# Patient Record
Sex: MALE | Race: WHITE | NOT HISPANIC OR LATINO | Employment: OTHER | URBAN - METROPOLITAN AREA
[De-identification: names, ages, dates, MRNs, and addresses within clinical notes are randomized per-mention and may not be internally consistent; named-entity substitution may affect disease eponyms.]

---

## 2022-04-29 ENCOUNTER — APPOINTMENT (EMERGENCY)
Dept: RADIOLOGY | Facility: HOSPITAL | Age: 72
DRG: 194 | End: 2022-04-29
Payer: MEDICARE

## 2022-04-29 ENCOUNTER — OFFICE VISIT (OUTPATIENT)
Dept: URGENT CARE | Facility: CLINIC | Age: 72
End: 2022-04-29
Payer: MEDICARE

## 2022-04-29 ENCOUNTER — APPOINTMENT (EMERGENCY)
Dept: CT IMAGING | Facility: HOSPITAL | Age: 72
DRG: 194 | End: 2022-04-29
Payer: MEDICARE

## 2022-04-29 ENCOUNTER — HOSPITAL ENCOUNTER (INPATIENT)
Facility: HOSPITAL | Age: 72
LOS: 1 days | Discharge: HOME | DRG: 194 | End: 2022-04-30
Attending: EMERGENCY MEDICINE | Admitting: INTERNAL MEDICINE
Payer: MEDICARE

## 2022-04-29 ENCOUNTER — APPOINTMENT (INPATIENT)
Dept: NON INVASIVE DIAGNOSTICS | Facility: HOSPITAL | Age: 72
DRG: 194 | End: 2022-04-29
Payer: MEDICARE

## 2022-04-29 VITALS
TEMPERATURE: 97.6 F | OXYGEN SATURATION: 95 % | SYSTOLIC BLOOD PRESSURE: 134 MMHG | DIASTOLIC BLOOD PRESSURE: 75 MMHG | HEART RATE: 92 BPM

## 2022-04-29 DIAGNOSIS — R06.00 DYSPNEA ON EXERTION: Primary | ICD-10-CM

## 2022-04-29 DIAGNOSIS — I48.20 CHRONIC ATRIAL FIBRILLATION WITH RVR (HCC): ICD-10-CM

## 2022-04-29 DIAGNOSIS — J81.0 ACUTE PULMONARY EDEMA (HCC): ICD-10-CM

## 2022-04-29 DIAGNOSIS — R09.02 HYPOXIA: ICD-10-CM

## 2022-04-29 DIAGNOSIS — J18.9 PNEUMONIA: Primary | ICD-10-CM

## 2022-04-29 DIAGNOSIS — I48.91 ATRIAL FIBRILLATION, UNSPECIFIED TYPE (HCC): ICD-10-CM

## 2022-04-29 PROBLEM — K21.9 GERD (GASTROESOPHAGEAL REFLUX DISEASE): Status: ACTIVE | Noted: 2022-04-29

## 2022-04-29 PROBLEM — R19.7 DIARRHEA: Status: ACTIVE | Noted: 2022-04-29

## 2022-04-29 PROBLEM — J81.1 PULMONARY EDEMA: Status: ACTIVE | Noted: 2022-04-29

## 2022-04-29 LAB
2HR DELTA HS TROPONIN: 0 NG/L
4HR DELTA HS TROPONIN: -1 NG/L
ALBUMIN SERPL BCP-MCNC: 4.2 G/DL (ref 3.5–5)
ALP SERPL-CCNC: 91 U/L (ref 34–104)
ALT SERPL W P-5'-P-CCNC: 17 U/L (ref 7–52)
ANION GAP SERPL CALCULATED.3IONS-SCNC: 7 MMOL/L (ref 4–13)
AORTIC ROOT: 4.1 CM
AORTIC VALVE MEAN VELOCITY: 10.7 M/S
APICAL FOUR CHAMBER EJECTION FRACTION: 60 %
AST SERPL W P-5'-P-CCNC: 15 U/L (ref 13–39)
AV LVOT MEAN GRADIENT: 2 MMHG
AV LVOT PEAK GRADIENT: 3 MMHG
AV MEAN GRADIENT: 5 MMHG
AV PEAK GRADIENT: 9 MMHG
AV VELOCITY RATIO: 0.58
BASOPHILS # BLD AUTO: 0.02 THOUSANDS/ΜL (ref 0–0.1)
BASOPHILS NFR BLD AUTO: 0 % (ref 0–1)
BILIRUB SERPL-MCNC: 2.18 MG/DL (ref 0.2–1)
BNP SERPL-MCNC: 163 PG/ML (ref 0–100)
BUN SERPL-MCNC: 15 MG/DL (ref 5–25)
CALCIUM SERPL-MCNC: 9.4 MG/DL (ref 8.4–10.2)
CARDIAC TROPONIN I PNL SERPL HS: 5 NG/L
CARDIAC TROPONIN I PNL SERPL HS: 6 NG/L
CARDIAC TROPONIN I PNL SERPL HS: 6 NG/L
CHLORIDE SERPL-SCNC: 99 MMOL/L (ref 96–108)
CO2 SERPL-SCNC: 30 MMOL/L (ref 21–32)
CREAT SERPL-MCNC: 1.1 MG/DL (ref 0.6–1.3)
DOP CALC AO PEAK VEL: 1.5 M/S
DOP CALC AO VTI: 28.15 CM
DOP CALC LVOT PEAK VEL VTI: 19.43 CM
DOP CALC LVOT PEAK VEL: 0.87 M/S
DOP CALC MV VTI: 40.6 CM
DOP CALC RVOT PEAK VEL: 0.68 M/S
DOP CALC RVOT VTI: 11.12 CM
EOSINOPHIL # BLD AUTO: 0.01 THOUSAND/ΜL (ref 0–0.61)
EOSINOPHIL NFR BLD AUTO: 0 % (ref 0–6)
ERYTHROCYTE [DISTWIDTH] IN BLOOD BY AUTOMATED COUNT: 12.4 % (ref 11.6–15.1)
FLUAV RNA RESP QL NAA+PROBE: NEGATIVE
FLUBV RNA RESP QL NAA+PROBE: NEGATIVE
FRACTIONAL SHORTENING: 36 % (ref 28–44)
GFR SERPL CREATININE-BSD FRML MDRD: 67 ML/MIN/1.73SQ M
GLUCOSE SERPL-MCNC: 117 MG/DL (ref 65–140)
HCT VFR BLD AUTO: 43.7 % (ref 36.5–49.3)
HGB BLD-MCNC: 14.7 G/DL (ref 12–17)
IMM GRANULOCYTES # BLD AUTO: 0.05 THOUSAND/UL (ref 0–0.2)
IMM GRANULOCYTES NFR BLD AUTO: 0 % (ref 0–2)
INR PPP: 2.52 (ref 0.84–1.19)
INTERVENTRICULAR SEPTUM IN DIASTOLE (PARASTERNAL SHORT AXIS VIEW): 1 CM
INTERVENTRICULAR SEPTUM: 1 CM (ref 0.55–1.04)
LAAS-AP4: 34.6 CM2
LEFT ATRIUM SIZE: 4.2 CM
LEFT INTERNAL DIMENSION IN SYSTOLE: 3.6 CM (ref 3.77–5.7)
LEFT VENTRICULAR INTERNAL DIMENSION IN DIASTOLE: 5.6 CM (ref 6.25–9.32)
LEFT VENTRICULAR POSTERIOR WALL IN END DIASTOLE: 0.9 CM (ref 0.54–1.02)
LEFT VENTRICULAR STROKE VOLUME: 103 ML
LVSV (TEICH): 103 ML
LYMPHOCYTES # BLD AUTO: 0.66 THOUSANDS/ΜL (ref 0.6–4.47)
LYMPHOCYTES NFR BLD AUTO: 5 % (ref 14–44)
MCH RBC QN AUTO: 31.9 PG (ref 26.8–34.3)
MCHC RBC AUTO-ENTMCNC: 33.6 G/DL (ref 31.4–37.4)
MCV RBC AUTO: 95 FL (ref 82–98)
MONOCYTES # BLD AUTO: 1.31 THOUSAND/ΜL (ref 0.17–1.22)
MONOCYTES NFR BLD AUTO: 9 % (ref 4–12)
MV E'TISSUE VEL-SEP: 10 CM/S
MV MEAN GRADIENT: 4 MMHG
MV PEAK GRADIENT: 13 MMHG
NEUTROPHILS # BLD AUTO: 12.11 THOUSANDS/ΜL (ref 1.85–7.62)
NEUTS SEG NFR BLD AUTO: 86 % (ref 43–75)
NRBC BLD AUTO-RTO: 0 /100 WBCS
PLATELET # BLD AUTO: 154 THOUSANDS/UL (ref 149–390)
PMV BLD AUTO: 10.1 FL (ref 8.9–12.7)
POTASSIUM SERPL-SCNC: 4.5 MMOL/L (ref 3.5–5.3)
PROCALCITONIN SERPL-MCNC: 0.27 NG/ML
PROT SERPL-MCNC: 7.2 G/DL (ref 6.4–8.4)
PROTHROMBIN TIME: 26.5 SECONDS (ref 11.6–14.5)
RBC # BLD AUTO: 4.61 MILLION/UL (ref 3.88–5.62)
RIGHT VENTRICLE ID DIMENSION: 4.5 CM
RSV RNA RESP QL NAA+PROBE: NEGATIVE
SARS-COV-2 RNA RESP QL NAA+PROBE: NEGATIVE
SL CV LV EF: 50
SL CV PED ECHO LEFT VENTRICLE DIASTOLIC VOLUME (MOD BIPLANE) 2D: 156 ML
SL CV PED ECHO LEFT VENTRICLE SYSTOLIC VOLUME (MOD BIPLANE) 2D: 53 ML
SL CV RVOT MAX GRADIENT: 2 MMHG
SL CV RVOT MEAN GRADIENT: 1 MMHG
SL CV RVOT VMEAN: 0.46 M/S
SODIUM SERPL-SCNC: 136 MMOL/L (ref 135–147)
TR MAX PG: 34 MMHG
TR PEAK VELOCITY: 2.9 M/S
TRICUSPID VALVE PEAK REGURGITATION VELOCITY: 2.91 M/S
WBC # BLD AUTO: 14.16 THOUSAND/UL (ref 4.31–10.16)
Z-SCORE OF INTERVENTRICULAR SEPTUM IN END DIASTOLE: 1.65
Z-SCORE OF LEFT VENTRICULAR DIMENSION IN END DIASTOLE: -3.09
Z-SCORE OF LEFT VENTRICULAR DIMENSION IN END SYSTOLE: -1.99
Z-SCORE OF LEFT VENTRICULAR POSTERIOR WALL IN END DIASTOLE: 0.95

## 2022-04-29 PROCEDURE — 99285 EMERGENCY DEPT VISIT HI MDM: CPT | Performed by: EMERGENCY MEDICINE

## 2022-04-29 PROCEDURE — 93306 TTE W/DOPPLER COMPLETE: CPT | Performed by: INTERNAL MEDICINE

## 2022-04-29 PROCEDURE — 99285 EMERGENCY DEPT VISIT HI MDM: CPT

## 2022-04-29 PROCEDURE — 93306 TTE W/DOPPLER COMPLETE: CPT

## 2022-04-29 PROCEDURE — 80053 COMPREHEN METABOLIC PANEL: CPT | Performed by: EMERGENCY MEDICINE

## 2022-04-29 PROCEDURE — 99203 OFFICE O/P NEW LOW 30 MIN: CPT | Performed by: NURSE PRACTITIONER

## 2022-04-29 PROCEDURE — G0463 HOSPITAL OUTPT CLINIC VISIT: HCPCS | Performed by: NURSE PRACTITIONER

## 2022-04-29 PROCEDURE — 93005 ELECTROCARDIOGRAM TRACING: CPT | Performed by: NURSE PRACTITIONER

## 2022-04-29 PROCEDURE — 36415 COLL VENOUS BLD VENIPUNCTURE: CPT | Performed by: EMERGENCY MEDICINE

## 2022-04-29 PROCEDURE — 94640 AIRWAY INHALATION TREATMENT: CPT

## 2022-04-29 PROCEDURE — 99223 1ST HOSP IP/OBS HIGH 75: CPT | Performed by: PHYSICIAN ASSISTANT

## 2022-04-29 PROCEDURE — 96374 THER/PROPH/DIAG INJ IV PUSH: CPT

## 2022-04-29 PROCEDURE — 84145 PROCALCITONIN (PCT): CPT

## 2022-04-29 PROCEDURE — 71045 X-RAY EXAM CHEST 1 VIEW: CPT

## 2022-04-29 PROCEDURE — 93005 ELECTROCARDIOGRAM TRACING: CPT

## 2022-04-29 PROCEDURE — 85025 COMPLETE CBC W/AUTO DIFF WBC: CPT | Performed by: EMERGENCY MEDICINE

## 2022-04-29 PROCEDURE — 85610 PROTHROMBIN TIME: CPT | Performed by: EMERGENCY MEDICINE

## 2022-04-29 PROCEDURE — 83880 ASSAY OF NATRIURETIC PEPTIDE: CPT | Performed by: EMERGENCY MEDICINE

## 2022-04-29 PROCEDURE — 0241U HB NFCT DS VIR RESP RNA 4 TRGT: CPT | Performed by: EMERGENCY MEDICINE

## 2022-04-29 PROCEDURE — 84484 ASSAY OF TROPONIN QUANT: CPT | Performed by: EMERGENCY MEDICINE

## 2022-04-29 PROCEDURE — 71260 CT THORAX DX C+: CPT

## 2022-04-29 PROCEDURE — G1004 CDSM NDSC: HCPCS

## 2022-04-29 RX ORDER — ACETAMINOPHEN 325 MG/1
650 TABLET ORAL EVERY 4 HOURS PRN
Status: DISCONTINUED | OUTPATIENT
Start: 2022-04-29 | End: 2022-04-30 | Stop reason: HOSPADM

## 2022-04-29 RX ORDER — ATORVASTATIN CALCIUM 10 MG/1
20 TABLET, FILM COATED ORAL DAILY
Status: DISCONTINUED | OUTPATIENT
Start: 2022-04-29 | End: 2022-04-30 | Stop reason: HOSPADM

## 2022-04-29 RX ORDER — AMLODIPINE BESYLATE 5 MG/1
5 TABLET ORAL DAILY
Status: DISCONTINUED | OUTPATIENT
Start: 2022-04-29 | End: 2022-04-30

## 2022-04-29 RX ORDER — ONDANSETRON 2 MG/ML
4 INJECTION INTRAMUSCULAR; INTRAVENOUS EVERY 6 HOURS PRN
Status: DISCONTINUED | OUTPATIENT
Start: 2022-04-29 | End: 2022-04-30 | Stop reason: HOSPADM

## 2022-04-29 RX ORDER — CEFTRIAXONE 1 G/50ML
1000 INJECTION, SOLUTION INTRAVENOUS EVERY 24 HOURS
Status: DISCONTINUED | OUTPATIENT
Start: 2022-04-30 | End: 2022-04-30 | Stop reason: HOSPADM

## 2022-04-29 RX ORDER — OMEPRAZOLE 20 MG/1
20 CAPSULE, DELAYED RELEASE ORAL DAILY
COMMUNITY
Start: 2022-03-09

## 2022-04-29 RX ORDER — AMOXICILLIN 500 MG/1
2000 CAPSULE ORAL
COMMUNITY
Start: 2022-02-04

## 2022-04-29 RX ORDER — PANTOPRAZOLE SODIUM 40 MG/1
40 TABLET, DELAYED RELEASE ORAL
Status: DISCONTINUED | OUTPATIENT
Start: 2022-04-30 | End: 2022-04-30 | Stop reason: HOSPADM

## 2022-04-29 RX ORDER — METOPROLOL TARTRATE 100 MG/1
100 TABLET ORAL 2 TIMES DAILY
COMMUNITY
Start: 2022-02-04

## 2022-04-29 RX ORDER — WARFARIN SODIUM 5 MG/1
5 TABLET ORAL
Status: DISCONTINUED | OUTPATIENT
Start: 2022-04-29 | End: 2022-04-30 | Stop reason: HOSPADM

## 2022-04-29 RX ORDER — METOPROLOL TARTRATE 50 MG/1
100 TABLET, FILM COATED ORAL 2 TIMES DAILY
Status: DISCONTINUED | OUTPATIENT
Start: 2022-04-29 | End: 2022-04-30 | Stop reason: HOSPADM

## 2022-04-29 RX ORDER — MONTELUKAST SODIUM 10 MG/1
10 TABLET ORAL DAILY
COMMUNITY
Start: 2022-03-05

## 2022-04-29 RX ORDER — FUROSEMIDE 10 MG/ML
40 INJECTION INTRAMUSCULAR; INTRAVENOUS ONCE
Status: COMPLETED | OUTPATIENT
Start: 2022-04-29 | End: 2022-04-29

## 2022-04-29 RX ORDER — BUDESONIDE AND FORMOTEROL FUMARATE DIHYDRATE 160; 4.5 UG/1; UG/1
2 AEROSOL RESPIRATORY (INHALATION) 2 TIMES DAILY
Status: DISCONTINUED | OUTPATIENT
Start: 2022-04-29 | End: 2022-04-30 | Stop reason: HOSPADM

## 2022-04-29 RX ORDER — WARFARIN SODIUM 5 MG/1
5 TABLET ORAL
COMMUNITY
Start: 2022-04-11

## 2022-04-29 RX ORDER — IPRATROPIUM BROMIDE AND ALBUTEROL SULFATE 2.5; .5 MG/3ML; MG/3ML
3 SOLUTION RESPIRATORY (INHALATION) ONCE
Status: COMPLETED | OUTPATIENT
Start: 2022-04-29 | End: 2022-04-29

## 2022-04-29 RX ORDER — AZITHROMYCIN 250 MG/1
500 TABLET, FILM COATED ORAL EVERY 24 HOURS
Status: DISCONTINUED | OUTPATIENT
Start: 2022-04-30 | End: 2022-04-30 | Stop reason: HOSPADM

## 2022-04-29 RX ORDER — FUROSEMIDE 20 MG/1
20 TABLET ORAL DAILY
COMMUNITY
Start: 2022-04-10 | End: 2022-04-30

## 2022-04-29 RX ORDER — ALBUTEROL SULFATE 90 UG/1
2 AEROSOL, METERED RESPIRATORY (INHALATION) ONCE
Status: COMPLETED | OUTPATIENT
Start: 2022-04-29 | End: 2022-04-29

## 2022-04-29 RX ORDER — MONTELUKAST SODIUM 10 MG/1
10 TABLET ORAL DAILY
Status: DISCONTINUED | OUTPATIENT
Start: 2022-04-29 | End: 2022-04-30 | Stop reason: HOSPADM

## 2022-04-29 RX ORDER — BUDESONIDE AND FORMOTEROL FUMARATE DIHYDRATE 160; 4.5 UG/1; UG/1
2 AEROSOL RESPIRATORY (INHALATION) 2 TIMES DAILY
COMMUNITY

## 2022-04-29 RX ORDER — ATORVASTATIN CALCIUM 20 MG/1
20 TABLET, FILM COATED ORAL DAILY
COMMUNITY
Start: 2022-02-11

## 2022-04-29 RX ORDER — SODIUM CHLORIDE 9 MG/ML
3 INJECTION INTRAVENOUS
Status: DISCONTINUED | OUTPATIENT
Start: 2022-04-29 | End: 2022-04-30 | Stop reason: HOSPADM

## 2022-04-29 RX ORDER — CEFTRIAXONE 1 G/50ML
1000 INJECTION, SOLUTION INTRAVENOUS ONCE
Status: COMPLETED | OUTPATIENT
Start: 2022-04-29 | End: 2022-04-29

## 2022-04-29 RX ORDER — AMLODIPINE BESYLATE 5 MG/1
5 TABLET ORAL DAILY
COMMUNITY
Start: 2022-02-27 | End: 2022-04-30

## 2022-04-29 RX ORDER — FUROSEMIDE 10 MG/ML
40 INJECTION INTRAMUSCULAR; INTRAVENOUS DAILY
Status: DISCONTINUED | OUTPATIENT
Start: 2022-04-30 | End: 2022-04-30 | Stop reason: HOSPADM

## 2022-04-29 RX ADMIN — WARFARIN SODIUM 5 MG: 5 TABLET ORAL at 17:58

## 2022-04-29 RX ADMIN — FUROSEMIDE 40 MG: 10 INJECTION, SOLUTION INTRAMUSCULAR; INTRAVENOUS at 10:36

## 2022-04-29 RX ADMIN — METOPROLOL TARTRATE 100 MG: 50 TABLET ORAL at 17:58

## 2022-04-29 RX ADMIN — MONTELUKAST 10 MG: 10 TABLET, FILM COATED ORAL at 17:58

## 2022-04-29 RX ADMIN — ALBUTEROL SULFATE 2 PUFF: 90 AEROSOL, METERED RESPIRATORY (INHALATION) at 14:32

## 2022-04-29 RX ADMIN — AMLODIPINE BESYLATE 5 MG: 5 TABLET ORAL at 17:58

## 2022-04-29 RX ADMIN — IPRATROPIUM BROMIDE AND ALBUTEROL SULFATE 3 ML: 2.5; .5 SOLUTION RESPIRATORY (INHALATION) at 10:36

## 2022-04-29 RX ADMIN — IOHEXOL 85 ML: 350 INJECTION, SOLUTION INTRAVENOUS at 11:29

## 2022-04-29 RX ADMIN — AZITHROMYCIN MONOHYDRATE 500 MG: 500 INJECTION, POWDER, LYOPHILIZED, FOR SOLUTION INTRAVENOUS at 15:46

## 2022-04-29 RX ADMIN — CEFTRIAXONE 1000 MG: 1 INJECTION, SOLUTION INTRAVENOUS at 14:33

## 2022-04-29 RX ADMIN — ATORVASTATIN CALCIUM 20 MG: 10 TABLET, FILM COATED ORAL at 17:57

## 2022-04-29 NOTE — PATIENT INSTRUCTIONS
You are to go to the [facility] ED for evaluation of dyspnea  You are to follow up with your PCP after the ED visit  Do not eat, drink or void until you get to the ED and they agree     Heart Failure   AMBULATORY CARE:   Heart failure  is a condition that does not allow your heart to fill or pump properly  Not enough oxygen in your blood gets to your organs and tissues  Fluid may not move through your body properly  Fluid builds up and causes swelling and trouble breathing  This is known as congestive heart failure  Heart failure may start in the left or right ventricle  Heart failure is often caused by damage or injury to your heart  The damage may be caused by other heart problems, diabetes, or high blood pressure  The damage may have also been caused by an infection  Heart failure is a long-term condition that tends to get worse over time  It is important to manage your health to improve your quality of life  Common signs and symptoms:   · Trouble breathing with activity that worsens to trouble breathing at rest    · Shortness of breath while lying flat    · Severe shortness of breath and coughing at night that usually wakes you    · Feeling lightheaded when you stand up    · Purple color around your mouth and nails    · Confusion or anxiety    · Chest pain at night    · Periods of no breathing, then breathing fast    · Lack of energy (often worsened by physical activity), or trouble sleeping    · Swelling in your ankles, legs, or abdomen    · Heartbeat that is fast or not regular    · Fingers and toes feel cool to the touch    Call your local emergency number (911 in the [address] [facility]) if:   · You have any of the following signs of a heart attack:      ?  Squeezing, pressure, or pain in your chest    ? You may  also have any of the following:     § Discomfort or pain in your back, neck, jaw, stomach, or arm    § Shortness of breath    § Nausea or vomiting    § Lightheadedness or a sudden cold sweat      Call your doctor if:   · Your heartbeat is fast, slow, or uneven all the time  · You have symptoms of worsening heart failure:      ? Shortness of breath at rest, at night, or that is getting worse in any way    ? Weight gain of 3 or more pounds (1 4 kg) in a day, or more than your healthcare provider says is okay    ? More swelling in your legs or ankles    ? Abdominal pain or swelling    ? More coughing    ? Loss of appetite    ? Feeling tired all the time    · You feel hopeless or depressed, or you have lost interest in things you used to enjoy  · You often feel worried or afraid  · You have questions or concerns about your condition or care  Treatment for heart failure  may include any of the following:  · Medicines  may be needed to help regulate your heart rhythm  You may also need medicines to lower your blood pressure, and to decrease extra fluids  · Oxygen  may help you breathe easier if your oxygen level is lower than normal  A CPAP machine may be used to keep your airway open while you sleep  · Cardiac rehab  is a program run by specialists who will help you safely strengthen your heart  In the program you will learn about exercise, relaxation, stress management, and heart-healthy nutrition  Cardiac rehab may be recommended if your heart failure is not severe  · Surgery  can be done to implant a pacemaker or another device in your chest to regulate your heart rhythm  Other types of surgery can open blocked heart vessels, replace a damaged heart valve, or remove scar tissue  Manage swelling from extra fluid:   · Elevate (raise) your legs above the level of your heart  This will help with fluid that builds up in your legs or ankles  Elevate your legs as often as possible during the day  Prop your legs on pillows or blankets to keep them elevated comfortably  Try not to stand for long periods of time during the day  Move around to keep your blood circulating           · Limit sodium (salt)  Ask how much sodium you can have each day  Your healthcare provider may give you a limit, such as 2,300 milligrams (mg) a day  Your provider or a dietitian can teach you how to read food labels for the number of mg in a food  He or she can also help you find ways to have less salt  For example, if you add salt to food as you cook, do not add more at the table  · Drink liquids as directed  You may need to limit the amount of liquid you drink within 24 hours  Your healthcare provider will tell you how much liquid to have and which liquids are best for you  He or she may tell you to limit liquid to 1 5 to 2 liters in a day  He or she will also tell you how often to drink liquid throughout the day  · Weigh yourself every morning  Use the same scale, in the same spot  Do this after you use the bathroom, but before you eat or drink  Wear the same type of clothing each time  Write down your weight and call your healthcare provider if you have a sudden weight gain  Swelling and weight gain are signs of fluid buildup  Manage heart failure: Your quality of life may improve with treatment and the following:  · Do not smoke  Nicotine and other chemicals in cigarettes and cigars can cause lung and heart damage  Ask your healthcare provider for information if you currently smoke and need help to quit  E-cigarettes or smokeless tobacco still contain nicotine  Talk to your healthcare provider before you use these products  · Do not drink alcohol or use illegal drugs  Alcohol and drugs can increase your risk for high blood pressure, diabetes, and coronary artery disease  · Eat heart-healthy foods  Heart-healthy foods include fruits, vegetables, lean meat (such as beef, chicken, or pork), and low-fat dairy products  Fatty fish such as salmon and tuna are also heart healthy  Other heart-healthy foods include walnuts, whole-grain breads, beans, and cooked beans   Replace butter and margarine with heart-healthy oils such as olive oil or canola oil  Your provider or a dietitian can help you create heart-healthy meal plans  · Manage any chronic health conditions you have  These include high blood pressure, diabetes, obesity, high cholesterol, metabolic syndrome, and COPD  You will have fewer symptoms if you manage these health conditions  Follow your healthcare provider's recommendations and follow up with him or her regularly  · Maintain a healthy weight  Being overweight can increase your risk for high blood pressure, diabetes, and coronary artery disease  These conditions can make your symptoms worse  Ask your healthcare provider how much you should weigh  Ask him or her to help you create a weight loss plan if you are overweight  · Stay active  Activity can help keep your symptoms from getting worse  Walking is a type of physical activity that helps maintain your strength and improve your mood  Physical activity also helps you manage your weight  Work with your healthcare provider to create an exercise plan that is right for you  · Get vaccines as directed  The flu and pneumonia can be severe for a person who has heart failure  Vaccines protect you from these infections  Get a flu shot every year as soon as it is recommended, usually in September or October  You may also need the pneumonia vaccine  Your healthcare provider can tell you if you need other vaccines, and when to get them  Follow up with your doctor or cardiologist within 7 days or as directed: You may need to return for other tests  You may need home health care  A healthcare provider will monitor your vital signs, weight, and make sure your medicines are working  Write down your questions so you remember to ask them during your visits  Join a support group:  Heart failure can be difficult to manage  It may be helpful to talk with others who have heart failure   You may learn how to better manage your condition or get emotional support  For more information:  · [address]  [place] , [place]   Phone: [phone]  Web Address: https://www strong [employer]/  [facility] [address] 2022 Information is for End User's use only and may not be sold, redistributed or otherwise used for commercial purposes  All illustrations and images included in CareNotes® are the copyrighted property of [employer]  or [address] [place], [place] [address]angelica moses   The above information is an  only  It is not intended as medical advice for individual conditions or treatments  Talk to your doctor, nurse or pharmacist before following any medical regimen to see if it is safe and effective for you

## 2022-04-29 NOTE — Clinical Note
Case was discussed with MISTY and the patient's admission status was agreed to be Admission Status: observation status to the service of Dr Emily Cornejo

## 2022-04-29 NOTE — ASSESSMENT & PLAN NOTE
· Patient with c/o TORRE x 3-4 weeks  Wife states he was to see his PCP and CXR was done  He was then placed on Lasix 20 mg daily by his PCP  · Patient with SOB last night  · CXR:   1   Pulmonary vascular congestion without overt edema  Superimposed patchy bibasilar airspace opacities concerning for pneumonia  2   Cardiomegaly with exaggeration of the upper mediastinum likely a result of obliquity  Likely left atrial enlargement  Recommend repeat PA and lateral radiographs if possible  · CT chest:   1  Scattered groundglass and nodular infiltrates especially in the right lungconcerning for multifocal pneumonia  Correlation for Covid 19 pneumonia is recommended    Short interval follow-up with low-dose CT thorax is recommended in 3 months interval   2  Cardiomegaly with bilateral pleural effusions and pericardial calcifications  · Patient received IV lasix 40 mg x 1 dose in ED  · Will start IV lasix 40 mg daily  · Echocardiogram  · Cardiology consult

## 2022-04-29 NOTE — ASSESSMENT & PLAN NOTE
· Patient currently rate controlled  · Continue home regimen of Lopressor   · Anticoagulated on Coumadin  · INR 2 52, repeat in am

## 2022-04-29 NOTE — RESPIRATORY THERAPY NOTE
RT Protocol Note  Annabella Height 71 y o  male MRN: [id number]  Unit/Bed#: ED 26 Encounter: [id number]    Assessment    Principal Problem:    Pneumonia  Active Problems:    Pulmonary edema    A-fib (HCC)    GERD (gastroesophageal reflux disease)    Diarrhea      Home Pulmonary Medications:  symbicort       Past Medical History:   Diagnosis Date    A-fib ([facility] [address] )     Mitral valve problem      Social History     Socioeconomic History    Marital status: /Civil Union     Spouse name: None    Number of children: None    Years of education: None    Highest education level: None   Occupational History    None   Tobacco Use    Smoking status: Never Smoker    Smokeless tobacco: Never Used   Vaping Use    Vaping Use: Never used   Substance and Sexual Activity    Alcohol use: Yes     Alcohol/week: 4 0 standard drinks     Types: 4 Cans of beer per week     Comment: daily    Drug use: Never    Sexual activity: None   Other Topics Concern    None   Social History Narrative    None     Social Determinants of Health     Financial Resource Strain: Not on file   Food Insecurity: Not on file   Transportation Needs: Not on file   Physical Activity: Not on file   Stress: Not on file   Social Connections: Not on file   Intimate Partner Violence: Not on file   Housing Stability: Not on file       Subjective         Objective    Physical Exam:   Assessment Type: (P) Assess only  General Appearance: (P) Alert,Awake  Respiratory Pattern: (P) Normal  Chest Assessment: (P) Chest expansion symmetrical  Bilateral Breath Sounds: (P) Clear  Cough: (P) Non-productive,Moist  O2 Device: (P) rma    Vitals:  Blood pressure 140/80, pulse 80, temperature 97 8 °F (36 6 °C), temperature source Tympanic, resp  rate 16, height 6' (1 829 m), weight 94 3 kg (208 lb), SpO2 98 %  Imaging and other studies: I have personally reviewed pertinent reports        O2 Device: (P) rma     Plan    Respiratory Plan: (P) Discontinue Protocol Resp Comments: (P) Pt  admitted for TORRE  Hx of asthma  Pt suses symbicort at home  Pt reports his brathing is much better after lasix  [facility]  No resp  therapy indicated  Will d/c resp   protocol

## 2022-04-29 NOTE — H&P
[address]  H&PDantcyndi Mendiola 1950, 71 y o  male MRN: [id number]  Unit/Bed#: ED 26 Encounter: [id number]  Primary Care Provider: No primary care provider on file  Date and time admitted to hospital: 4/29/2022  9:26 AM    * Pneumonia  Assessment & Plan  · Patient with TORRE x 3-4 weeks  Improved with PO lasix started by PCP after CXR  Patient with SOB last night  · CT Chest:  1  Scattered groundglass and nodular infiltrates especially in the right lungconcerning for multifocal pneumonia  Correlation for Covid 19 pneumonia is recommended  Short interval follow-up with low-dose CT thorax is recommended in 3 months interval   2  Cardiomegaly with bilateral pleural effusions and pericardial calcifications  · Patient received IV Rocephin and po azithromycin in the ED, will continue on admission  · Check strep pneumonia and legionella urinary antigens  · Sputum culture  · Labs in am    Pulmonary edema  Assessment & Plan  · Patient with c/o TORRE x 3-4 weeks  Wife states he was to see his PCP and CXR was done  He was then placed on Lasix 20 mg daily by his PCP  · Patient with SOB last night  · CXR:   1   Pulmonary vascular congestion without overt edema  Superimposed patchy bibasilar airspace opacities concerning for pneumonia  2   Cardiomegaly with exaggeration of the upper mediastinum likely a result of obliquity  Likely left atrial enlargement  Recommend repeat PA and lateral radiographs if possible  · CT chest:   1  Scattered groundglass and nodular infiltrates especially in the right lungconcerning for multifocal pneumonia  Correlation for Covid 19 pneumonia is recommended    Short interval follow-up with low-dose CT thorax is recommended in 3 months interval   2  Cardiomegaly with bilateral pleural effusions and pericardial calcifications  · Patient received IV lasix 40 mg x 1 dose in ED  · Will start IV lasix 40 mg daily  · Echocardiogram  · Cardiology consult    A-fib [facility])  Assessment & Plan  · Patient currently rate controlled  · Continue home regimen of Lopressor   · Anticoagulated on Coumadin  · INR 2 52, repeat in am    GERD (gastroesophageal reflux disease)  Assessment & Plan  · Continue home regimen of PPI    Diarrhea  Assessment & Plan  · Patient reports diarrhea last night  · Check cdiff, stool culture    VTE Pharmacologic Prophylaxis: VTE Score: 4 Moderate Risk (Score 3-4) - Pharmacological DVT Prophylaxis Ordered: warfarin (Coumadin)  Code Status: Level 1 - Full Code   Discussion with family: Updated  (wife) at bedside  Anticipated Length of Stay: Patient will be admitted on an inpatient basis with an anticipated length of stay of greater than 2 midnights secondary to Need for IV antibiotics, IV lasix, echo  Total Time for Visit, including Counseling / Coordination of Care: 60 minutes Greater than 50% of this total time spent on direct patient counseling and coordination of care  Chief Complaint: shortness of breath    History of Present Illness:  Carolina Hoyos is a 71 y o  male with a PMH of Afib, HTN, GERD who presents with a complaint of shortness of breath and diarrhea  The patient states he was having TORRE about 3-4 weeks ago  He saw his PCP and had a CXR then started on PO lasix 20 mg daily  The last night after eating dinner the patient states that started to not feel well had some diarrhea and shortness of breath returned  Patient admits to chills and his wife's stated he felt warm  She states that he cannot walk few feet without being short of breath which prompted him to come to emergency department  He denies any headaches, chest pain, abdominal pain, nausea/vomiting  He states he is feeling improved since coming in the emergency department  Chest x-ray showed pulmonary vascular congestion with superimposed patchy bibasilar airspace opacities concerning for pneumonia  Patient had CT of the chest which showed similar findings  Patient denies history of CHF  Review of Systems:  Review of Systems   Constitutional: Positive for chills and fever  Respiratory: Positive for shortness of breath  Gastrointestinal: Positive for diarrhea  Past Medical and Surgical History:   Past Medical History:   Diagnosis Date    A-fib [facility])     Mitral valve problem        History reviewed  No pertinent surgical history  Meds/Allergies:  Prior to Admission medications    Medication Sig Start Date End Date Taking? Authorizing Provider   amLODIPine (NORVASC) 5 mg tablet Take 5 mg by mouth daily 2/27/22  Yes Historical Provider, MD   atorvastatin (LIPITOR) 20 mg tablet Take 20 mg by mouth daily 2/11/22  Yes Historical Provider, MD   budesonide-formoterol (SYMBICORT) 160-4 5 mcg/act inhaler Inhale 2 puffs 2 (two) times a day Rinse mouth after use  Yes Historical Provider, MD   furosemide (LASIX) 20 mg tablet Take 20 mg by mouth daily 4/10/22  Yes Historical Provider, MD   metoprolol tartrate (LOPRESSOR) 100 mg tablet Take 100 mg by mouth 2 (two) times a day 2/4/22  Yes Historical Provider, MD   montelukast (SINGULAIR) 10 mg tablet Take 10 mg by mouth daily As directed 3/5/22  Yes Historical Provider, MD   omeprazole (PriLOSEC) 20 mg delayed release capsule Take 20 mg by mouth daily 3/9/22  Yes Historical Provider, MD   warfarin (COUMADIN) 5 mg tablet 5 mg   4/11/22  Yes Historical Provider, MD   amoxicillin (AMOXIL) 500 mg capsule Take 2,000 mg by mouth 60 minutes pre-procedure 2/4/22   Historical Provider, MD     I have reviewed home medications with patient family member      Allergies: No Known Allergies    Social History:  Marital Status: /Civil Union   Occupation: unknown  Patient Pre-hospital Living Situation: Home  Patient Pre-hospital Level of Mobility: walks  Patient Pre-hospital Diet Restrictions: none  Substance Use History:   Social History     Substance and Sexual Activity   Alcohol Use Yes    Alcohol/week: 4 0 standard drinks    Types: 4 Cans of beer per week    Comment: daily     Social History     Tobacco Use   Smoking Status Never Smoker   Smokeless Tobacco Never Used     Social History     Substance and Sexual Activity   Drug Use Never       Family History:  History reviewed  No pertinent family history  Physical Exam:     Vitals:   Blood Pressure: 132/76 (04/29/22 1315)  Pulse: 103 (04/29/22 1315)  Temperature: 97 8 °F (36 6 °C) (04/29/22 1511)  Temp Source: Tympanic (04/29/22 1511)  Respirations: 16 (04/29/22 1315)  Height: 6' (182 9 cm) (04/29/22 3272)  Weight - Scale: 94 3 kg (208 lb) (04/29/22 0923)  SpO2: 98 % (04/29/22 1511)    Physical Exam  Vitals and nursing note reviewed  Constitutional:       General: He is awake  Appearance: Normal appearance  HENT:      Head: Normocephalic and atraumatic  Cardiovascular:      Rate and Rhythm: Normal rate and regular rhythm  Pulmonary:      Effort: Pulmonary effort is normal       Breath sounds: Normal breath sounds  No wheezing, rhonchi or rales  Abdominal:      Palpations: Abdomen is soft  Tenderness: There is no abdominal tenderness  Skin:     General: Skin is warm and dry  Neurological:      General: No focal deficit present  Mental Status: He is alert and oriented to person, place, and time  Psychiatric:         Attention and Perception: Attention normal          Mood and Affect: Mood normal          Speech: Speech normal          Behavior: Behavior is cooperative          Additional Data:     Lab Results:  Results from last 7 days   Lab Units 04/29/22  1020   WBC Thousand/uL 14 16*   HEMOGLOBIN g/dL 14 7   HEMATOCRIT % 43 7   PLATELETS Thousands/uL 154   NEUTROS PCT % 86*   LYMPHS PCT % 5*   MONOS PCT % 9   EOS PCT % 0     Results from last 7 days   Lab Units 04/29/22  1020   SODIUM mmol/L 136   POTASSIUM mmol/L 4 5   CHLORIDE mmol/L 99   CO2 mmol/L 30   BUN mg/dL 15   CREATININE mg/dL 1 10   ANION GAP mmol/L 7   CALCIUM mg/dL 9 4   ALBUMIN g/dL 4 2   TOTAL BILIRUBIN mg/dL 2 18*   ALK PHOS U/L 91   ALT U/L 17   AST U/L 15   GLUCOSE RANDOM mg/dL 117     Results from last 7 days   Lab Units 04/29/22  1020   INR  2 52*                   Imaging: Reviewed radiology reports from this admission including: chest xray and chest CT scan  CT chest with contrast   Final Result by Moi Garcia MD (04/29 1157)   1  Scattered groundglass and nodular infiltrates especially in the right lungconcerning for multifocal pneumonia  Correlation for Covid 19 pneumonia is recommended  Short interval follow-up with low-dose CT thorax is recommended in 3 months interval    2  Cardiomegaly with bilateral pleural effusions and pericardial calcifications  The study was marked in [facility] for immediate notification of acute findings and need for short interval follow-up  Workstation performed: [id number]         X-ray chest 1 view portable   ED Interpretation by Gayla Goncalves MD (04/29 1008)   ED wet read:  Pulmonary vascular congestion noted      Final Result by Klarissa Aparicio MD (04/29 1057)      1  Pulmonary vascular congestion without overt edema  Superimposed patchy bibasilar airspace opacities concerning for pneumonia  2   Cardiomegaly with exaggeration of the upper mediastinum likely a result of obliquity  Likely left atrial enlargement  Recommend repeat PA and lateral radiographs if possible  The study was marked in EPIC for significant notification  Workstation performed: [id number]                 ** Please Note: This note has been constructed using a voice recognition system   **

## 2022-04-29 NOTE — PROGRESS NOTES
[address]        NAME: Anuja Rodriguez is a 71 y o  male  : 1950    MRN: [id number]  DATE: 2022  TIME: 8:49 AM    Assessment and Plan   Dyspnea on exertion [R06 00]  1  Dyspnea on exertion  Transfer to other facility   2  Chronic atrial fibrillation with RVR ([facility] [address] )  Transfer to other facility         Patient Instructions       Follow up with PCP in 3-5 days  Proceed to  ER if symptoms worsen  You are to go to the [facility] ED for evaluation of dyspnea  You are to follow up with your PCP after the ED visit  Do not eat, drink or void until you get to the ED and they agree      Chief Complaint     Chief Complaint   Patient presents with    Respiratory Distress     3 days ago, has h/o asthma    Diarrhea     resolved as of this morning     Cough     has brownish mucus         History of Present Illness       This is a 71year old male who moved to [place] 1 yr ago and states has put on 30 pounds  He states that over the last few days he has noted a cough, and shortness of breath  He states he does have asthma  He is on lasix for "a heart valve problem" and has chronic Afib and is on coumadin  Pt states he had some diarrhea from fish he ate last night and it has subsided since  He states the cough is a "brownish color"  Pt was 92 % room air with tachypnea while walking up ramp  95% at rest         Review of Systems   Review of Systems   Constitutional: Negative  HENT: Negative  Eyes: Negative  Respiratory: Positive for cough and shortness of breath  Cardiovascular: Negative for chest pain  Gastrointestinal: Positive for diarrhea  Endocrine: Negative  Genitourinary: Negative  Musculoskeletal: Negative  Allergic/Immunologic: Negative  Neurological: Negative  Hematological: Negative  Psychiatric/Behavioral: Negative            Current Medications       Current Outpatient Medications:     amLODIPine (NORVASC) 5 mg tablet, Take 5 mg by mouth daily, Disp: , Rfl:     amoxicillin (AMOXIL) 500 mg capsule, Take 2,000 mg by mouth 60 minutes pre-procedure, Disp: , Rfl:     atorvastatin (LIPITOR) 20 mg tablet, Take 20 mg by mouth daily, Disp: , Rfl:     furosemide (LASIX) 20 mg tablet, Take 20 mg by mouth daily, Disp: , Rfl:     metoprolol tartrate (LOPRESSOR) 100 mg tablet, Take 100 mg by mouth 2 (two) times a day, Disp: , Rfl:     montelukast (SINGULAIR) 10 mg tablet, Take 10 mg by mouth daily As directed, Disp: , Rfl:     omeprazole (PriLOSEC) 20 mg delayed release capsule, Take 20 mg by mouth daily, Disp: , Rfl:     warfarin (COUMADIN) 5 mg tablet, TAKE 1 TO 2 TABLET BY MOUTH EVERY DAY OR AS DIRECTED BY INR RESULTS, Disp: , Rfl:     Current Allergies     Allergies as of 04/29/2022    (No Known Allergies)            The following portions of the patient's history were reviewed and updated as appropriate: allergies, current medications, past family history, past medical history, past social history, past surgical history and problem list      Past Medical History:   Diagnosis Date    A-fib [facility])     Mitral valve problem        History reviewed  No pertinent surgical history  History reviewed  No pertinent family history  Medications have been verified  Objective   /75   Pulse 92   Temp 97 6 °F (36 4 °C)   SpO2 95% Comment: 92 on exertion  No LMP for male patient  Physical Exam     Physical Exam  Vitals and nursing note reviewed  Constitutional:       General: He is not in acute distress  Appearance: Normal appearance  He is normal weight  He is not ill-appearing, toxic-appearing or diaphoretic  HENT:      Head: Normocephalic and atraumatic  Nose: Nose normal       Mouth/Throat:      Mouth: Mucous membranes are moist       Pharynx: Oropharynx is clear  No oropharyngeal exudate or posterior oropharyngeal erythema  Eyes:      Extraocular Movements: Extraocular movements intact     Cardiovascular:      Rate and Rhythm: Tachycardia present  Rhythm irregular  Pulses: Normal pulses  Heart sounds: Normal heart sounds  Comments: Sock marking edema to ankles   Pulmonary:      Comments: Tachypnea with ambulation  Slightly SOB with talking  Pt does stutter  Lungs are very decreased though out in all fields  Abdominal:      General: There is no distension  Palpations: Abdomen is soft  Tenderness: There is no abdominal tenderness  Musculoskeletal:         General: Normal range of motion  Cervical back: Normal range of motion and neck supple  Skin:     General: Skin is warm and dry  Capillary Refill: Capillary refill takes less than 2 seconds  Neurological:      General: No focal deficit present  Mental Status: He is alert and oriented to person, place, and time  Psychiatric:         Mood and Affect: Mood normal          Behavior: Behavior normal          Thought Content:  Thought content normal          Judgment: Judgment normal

## 2022-04-29 NOTE — ASSESSMENT & PLAN NOTE
· Patient with TORRE x 3-4 weeks  Improved with PO lasix started by PCP after CXR  Patient with SOB last night  · CT Chest:  1  Scattered groundglass and nodular infiltrates especially in the right lungconcerning for multifocal pneumonia  Correlation for Covid 19 pneumonia is recommended  Short interval follow-up with low-dose CT thorax is recommended in 3 months interval   2  Cardiomegaly with bilateral pleural effusions and pericardial calcifications  · Patient received IV Rocephin and po azithromycin in the ED, will continue on admission  · Check strep pneumonia and legionella urinary antigens     · Sputum culture  · Labs in am

## 2022-04-30 VITALS
TEMPERATURE: 98.2 F | WEIGHT: 208 LBS | RESPIRATION RATE: 17 BRPM | DIASTOLIC BLOOD PRESSURE: 74 MMHG | SYSTOLIC BLOOD PRESSURE: 126 MMHG | OXYGEN SATURATION: 96 % | BODY MASS INDEX: 28.17 KG/M2 | HEART RATE: 76 BPM | HEIGHT: 72 IN

## 2022-04-30 PROBLEM — E87.6 HYPOKALEMIA: Status: ACTIVE | Noted: 2022-04-30

## 2022-04-30 LAB
ANION GAP SERPL CALCULATED.3IONS-SCNC: 6 MMOL/L (ref 4–13)
ATRIAL RATE: 80 BPM
ATRIAL RATE: 97 BPM
BUN SERPL-MCNC: 18 MG/DL (ref 5–25)
CALCIUM SERPL-MCNC: 9.1 MG/DL (ref 8.4–10.2)
CHLORIDE SERPL-SCNC: 99 MMOL/L (ref 96–108)
CO2 SERPL-SCNC: 31 MMOL/L (ref 21–32)
CREAT SERPL-MCNC: 0.95 MG/DL (ref 0.6–1.3)
ERYTHROCYTE [DISTWIDTH] IN BLOOD BY AUTOMATED COUNT: 12.4 % (ref 11.6–15.1)
GFR SERPL CREATININE-BSD FRML MDRD: 80 ML/MIN/1.73SQ M
GLUCOSE SERPL-MCNC: 104 MG/DL (ref 65–140)
HCT VFR BLD AUTO: 40.9 % (ref 36.5–49.3)
HGB BLD-MCNC: 13.4 G/DL (ref 12–17)
MAGNESIUM SERPL-MCNC: 2.2 MG/DL (ref 1.9–2.7)
MCH RBC QN AUTO: 31.2 PG (ref 26.8–34.3)
MCHC RBC AUTO-ENTMCNC: 32.8 G/DL (ref 31.4–37.4)
MCV RBC AUTO: 95 FL (ref 82–98)
PHOSPHATE SERPL-MCNC: 2.5 MG/DL (ref 2.3–4.1)
PLATELET # BLD AUTO: 149 THOUSANDS/UL (ref 149–390)
PMV BLD AUTO: 9.7 FL (ref 8.9–12.7)
POTASSIUM SERPL-SCNC: 3.3 MMOL/L (ref 3.5–5.3)
PROCALCITONIN SERPL-MCNC: 0.33 NG/ML
QRS AXIS: 87 DEGREES
QRS AXIS: 91 DEGREES
QRSD INTERVAL: 168 MS
QRSD INTERVAL: 170 MS
QT INTERVAL: 400 MS
QT INTERVAL: 404 MS
QTC INTERVAL: 477 MS
QTC INTERVAL: 521 MS
RBC # BLD AUTO: 4.3 MILLION/UL (ref 3.88–5.62)
SODIUM SERPL-SCNC: 136 MMOL/L (ref 135–147)
T WAVE AXIS: 13 DEGREES
T WAVE AXIS: 2 DEGREES
VENTRICULAR RATE: 102 BPM
VENTRICULAR RATE: 84 BPM
WBC # BLD AUTO: 8.66 THOUSAND/UL (ref 4.31–10.16)

## 2022-04-30 PROCEDURE — 80048 BASIC METABOLIC PNL TOTAL CA: CPT | Performed by: PHYSICIAN ASSISTANT

## 2022-04-30 PROCEDURE — 84145 PROCALCITONIN (PCT): CPT | Performed by: PHYSICIAN ASSISTANT

## 2022-04-30 PROCEDURE — 85027 COMPLETE CBC AUTOMATED: CPT | Performed by: PHYSICIAN ASSISTANT

## 2022-04-30 PROCEDURE — 99238 HOSP IP/OBS DSCHRG MGMT 30/<: CPT | Performed by: PHYSICIAN ASSISTANT

## 2022-04-30 PROCEDURE — 84100 ASSAY OF PHOSPHORUS: CPT | Performed by: PHYSICIAN ASSISTANT

## 2022-04-30 PROCEDURE — 83735 ASSAY OF MAGNESIUM: CPT | Performed by: PHYSICIAN ASSISTANT

## 2022-04-30 PROCEDURE — 93010 ELECTROCARDIOGRAM REPORT: CPT | Performed by: INTERNAL MEDICINE

## 2022-04-30 PROCEDURE — 36415 COLL VENOUS BLD VENIPUNCTURE: CPT | Performed by: PHYSICIAN ASSISTANT

## 2022-04-30 PROCEDURE — 99223 1ST HOSP IP/OBS HIGH 75: CPT | Performed by: INTERNAL MEDICINE

## 2022-04-30 RX ORDER — DILTIAZEM HYDROCHLORIDE 180 MG/1
180 CAPSULE, COATED, EXTENDED RELEASE ORAL DAILY
Qty: 30 CAPSULE | Refills: 0 | Status: SHIPPED | OUTPATIENT
Start: 2022-05-01

## 2022-04-30 RX ORDER — CEFPODOXIME PROXETIL 200 MG/1
200 TABLET, FILM COATED ORAL 2 TIMES DAILY
Qty: 8 TABLET | Refills: 0 | Status: SHIPPED | OUTPATIENT
Start: 2022-04-30 | End: 2022-05-04

## 2022-04-30 RX ORDER — DILTIAZEM HYDROCHLORIDE 180 MG/1
180 CAPSULE, COATED, EXTENDED RELEASE ORAL DAILY
Status: DISCONTINUED | OUTPATIENT
Start: 2022-04-30 | End: 2022-04-30 | Stop reason: HOSPADM

## 2022-04-30 RX ORDER — POTASSIUM CHLORIDE 20 MEQ/1
40 TABLET, EXTENDED RELEASE ORAL ONCE
Status: COMPLETED | OUTPATIENT
Start: 2022-04-30 | End: 2022-04-30

## 2022-04-30 RX ORDER — FUROSEMIDE 40 MG/1
40 TABLET ORAL DAILY
Qty: 30 TABLET | Refills: 0 | Status: SHIPPED | OUTPATIENT
Start: 2022-04-30 | End: 2022-05-30

## 2022-04-30 RX ADMIN — POTASSIUM CHLORIDE 40 MEQ: 1500 TABLET, EXTENDED RELEASE ORAL at 10:02

## 2022-04-30 RX ADMIN — FUROSEMIDE 40 MG: 10 INJECTION, SOLUTION INTRAMUSCULAR; INTRAVENOUS at 08:06

## 2022-04-30 RX ADMIN — AMLODIPINE BESYLATE 5 MG: 5 TABLET ORAL at 08:06

## 2022-04-30 RX ADMIN — DILTIAZEM HYDROCHLORIDE 180 MG: 180 CAPSULE, COATED, EXTENDED RELEASE ORAL at 08:58

## 2022-04-30 RX ADMIN — ATORVASTATIN CALCIUM 20 MG: 10 TABLET, FILM COATED ORAL at 08:06

## 2022-04-30 RX ADMIN — METOPROLOL TARTRATE 100 MG: 50 TABLET ORAL at 08:06

## 2022-04-30 RX ADMIN — MONTELUKAST 10 MG: 10 TABLET, FILM COATED ORAL at 08:06

## 2022-04-30 RX ADMIN — BUDESONIDE AND FORMOTEROL FUMARATE DIHYDRATE 2 PUFF: 160; 4.5 AEROSOL RESPIRATORY (INHALATION) at 08:06

## 2022-04-30 RX ADMIN — PANTOPRAZOLE SODIUM 40 MG: 40 TABLET, DELAYED RELEASE ORAL at 05:31

## 2022-04-30 NOTE — ASSESSMENT & PLAN NOTE
Patient presents to  and was sent to ED  with Afib with RVR in the setting of Pneumonia    Likely exacerbated by underlying infectious process   Rate is now moderately controlled    He is on metoprolol for rate control in the outpatient setting  Patient is on Norvasc for BP control and BPs are soft    Will discontinue Norvasc and add diltiazem for better rate control   Continue Coumadin for stroke risk prevention    Target INR is 2 0-3 0

## 2022-04-30 NOTE — DISCHARGE SUMMARY
[address]  Discharge- Ashley Turcios 1950, 71 y o  male MRN: [id number]  Unit/Bed#: ED 32 Encounter: [id number]  Primary Care Provider: No primary care provider on file  Date and time admitted to hospital: 4/29/2022  9:26 AM    * Pneumonia  Assessment & Plan  · Patient with TORRE x 3-4 weeks  Improved with PO lasix started by PCP after CXR  Patient with SOB last night  · CT Chest:  1  Scattered groundglass and nodular infiltrates especially in the right lungconcerning for multifocal pneumonia  Correlation for Covid 19 pneumonia is recommended  Short interval follow-up with low-dose CT thorax is recommended in 3 months interval   2  Cardiomegaly with bilateral pleural effusions and pericardial calcifications  · Patient received IV Rocephin and po azithromycin in the ED, this was continued on admission  · The patient was discharged on cefpodoxime to complete a 5 day course  · Outpatient follow-up with PCP in 4-6 weeks for repeat CXR    Pulmonary edema  Assessment & Plan  · Patient with c/o TORRE x 3-4 weeks  Wife states he was to see his PCP and CXR was done  He was then placed on Lasix 20 mg daily by his PCP  · Patient with SOB last night  · CXR:   1   Pulmonary vascular congestion without overt edema  Superimposed patchy bibasilar airspace opacities concerning for pneumonia  2   Cardiomegaly with exaggeration of the upper mediastinum likely a result of obliquity  Likely left atrial enlargement  Recommend repeat PA and lateral radiographs if possible  · CT chest:   1  Scattered groundglass and nodular infiltrates especially in the right lungconcerning for multifocal pneumonia  Correlation for Covid 19 pneumonia is recommended    Short interval follow-up with low-dose CT thorax is recommended in 3 months interval   2  Cardiomegaly with bilateral pleural effusions and pericardial calcifications  · Echocardiogram:LVEF low normal 50% no WMA, RV mildly dilated, mild LAD, moderate RAD, mild AI with aortic sclerosis normal bio mitral valve mean gradient 4 3 mm Hg mild TR mild aortic root dilation  · Patient received IV lasix 40 mg x 1 dose in ED and was started on admission  · Cardiology consultation appreciated- Norvasc discontinued and started on Cardizem  Lasix increased to 40 mg daily on discharge  · Outpatient follow-up with primary cardiologist and PCP     A-fib [facility])  Assessment & Plan  · Patient currently rate controlled  · Continue home regimen of Lopressor   · Cardiology added Cardizem- this was continued on discharge  · Anticoagulated on Coumadin  · INR 2 52,     GERD (gastroesophageal reflux disease)  Assessment & Plan  · Continue home regimen of PPI    Hypokalemia  Assessment & Plan  · Mild at 3 3  · Patient received PO potassium 40 mEq on day of discharge  · Outpatient follow up with PCP    Diarrhea  Assessment & Plan  · Patient reports diarrhea the night prior to admission   · Cdiff, stool culture ordered however diarrhea resolved and no specimen collected       Medical Problems             Resolved Problems  Date Reviewed: 4/30/2022    None              Discharging Physician / Practitioner: Lupillo Bryant PA-C  PCP: No primary care provider on file  Admission Date:   Admission Orders (From admission, onward)     Ordered        04/29/22 1413  Inpatient Admission  Once                      Discharge Date: 04/30/22    Consultations During Hospital Stay:  · Cardiology    Procedures Performed:   · none    Significant Findings / Test Results:   X-ray chest 1 view portable    Result Date: 4/29/2022  Impression: 1  Pulmonary vascular congestion without overt edema  Superimposed patchy bibasilar airspace opacities concerning for pneumonia  2   Cardiomegaly with exaggeration of the upper mediastinum likely a result of obliquity  Likely left atrial enlargement  Recommend repeat PA and lateral radiographs if possible  The study was marked in EPIC for significant notification   Workstation performed: [id number]     CT chest with contrast    Result Date: 4/29/2022  · Impression: 1  Scattered groundglass and nodular infiltrates especially in the right lungconcerning for multifocal pneumonia  Correlation for Covid 19 pneumonia is recommended  Short interval follow-up with low-dose CT thorax is recommended in 3 months interval  2  Cardiomegaly with bilateral pleural effusions and pericardial calcifications  The study was marked in [facility] for immediate notification of acute findings and need for short interval follow-up  Workstation performed: [id number]   ·     Incidental Findings:   · none     Test Results Pending at Discharge (will require follow up):   · none     Outpatient Tests Requested:  · none    Complications:  none    Reason for Admission: pneumonia, pulmonary edema     Hospital Course:   Luna Barthel is a 71 y o  male patient who originally presented to the hospital on 4/29/2022 due to shortness of breath  Please see H&P on 4/29/2022 for complete details of presentation  CT chest and CXR showed pulmonary edema and pneumonia  The patient was started on IV lasix and IV antibiotics  Echo showed low normal EF of 50%, no WMA, RV mildly dilated, mild LAD, moderate RAD  The patient's breathing improved  Cardiology was consulted and discontinued Norvasc and switched to Cardizem  Cardiology also recommended increasing po lasix from 20 mg to 40 mg daily  The patient was discharged home with a script for antibiotics to complete a 5 day course and scripts for lasix, Cardizem  The patient was instructed to follow-up with his cardiologist and PCP  The patient, initially admitted to the hospital as inpatient, was discharged earlier than expected given the following: improved quicker than expected with IV antibiotics and IV Lasix  Please see above list of diagnoses and related plan for additional information       Condition at Discharge: good    Discharge Day Visit / Exam:   Subjective:    Vitals: Blood Pressure: 126/74 (04/30/22 1437)  Pulse: 76 (04/30/22 1437)  Temperature: 98 2 °F (36 8 °C) (04/30/22 0815)  Temp Source: Temporal (04/30/22 0815)  Respirations: 17 (04/30/22 1437)  Height: 6' (182 9 cm) (04/29/22 1618)  Weight - Scale: 94 3 kg (208 lb) (04/29/22 1618)  SpO2: 96 % (04/30/22 1437)  Exam:   Physical Exam  Vitals and nursing note reviewed  Constitutional:       Appearance: Normal appearance  HENT:      Head: Normocephalic and atraumatic  Cardiovascular:      Rate and Rhythm: Normal rate and regular rhythm  Pulses: Normal pulses  Heart sounds: Normal heart sounds  Pulmonary:      Breath sounds: Normal breath sounds  No wheezing, rhonchi or rales  Abdominal:      General: There is no distension  Palpations: Abdomen is soft  Tenderness: There is no abdominal tenderness  Skin:     General: Skin is warm and dry  Neurological:      General: No focal deficit present  Mental Status: He is alert and oriented to person, place, and time  Discussion with Family: Updated  (wife) at bedside  Discharge instructions/Information to patient and family:   See after visit summary for information provided to patient and family  Provisions for Follow-Up Care:  See after visit summary for information related to follow-up care and any pertinent home health orders  Disposition:   Home    Planned Readmission: no     Discharge Statement:  I spent 25 minutes discharging the patient  This time was spent on the day of discharge  I had direct contact with the patient on the day of discharge  Greater than 50% of the total time was spent examining patient, answering all patient questions, arranging and discussing plan of care with patient as well as directly providing post-discharge instructions  Additional time then spent on discharge activities      Discharge Medications:  See after visit summary for reconciled discharge medications provided to patient and/or family        **Please Note: This note may have been constructed using a voice recognition system**

## 2022-04-30 NOTE — ASSESSMENT & PLAN NOTE
· Patient with c/o TORRE x 3-4 weeks  Wife states he was to see his PCP and CXR was done  He was then placed on Lasix 20 mg daily by his PCP  · Patient with SOB last night  · CXR:   1   Pulmonary vascular congestion without overt edema  Superimposed patchy bibasilar airspace opacities concerning for pneumonia  2   Cardiomegaly with exaggeration of the upper mediastinum likely a result of obliquity  Likely left atrial enlargement  Recommend repeat PA and lateral radiographs if possible  · CT chest:   1  Scattered groundglass and nodular infiltrates especially in the right lungconcerning for multifocal pneumonia  Correlation for Covid 19 pneumonia is recommended  Short interval follow-up with low-dose CT thorax is recommended in 3 months interval   2  Cardiomegaly with bilateral pleural effusions and pericardial calcifications  · Echocardiogram:LVEF low normal 50% no WMA, RV mildly dilated, mild LAD, moderate RAD, mild AI with aortic sclerosis normal bio mitral valve mean gradient 4 3 mm Hg mild TR mild aortic root dilation  · Patient received IV lasix 40 mg x 1 dose in ED and was started on admission  · Cardiology consultation appreciated- Norvasc discontinued and started on Cardizem  Lasix increased to 40 mg daily on discharge     · Outpatient follow-up with primary cardiologist and PCP

## 2022-04-30 NOTE — ASSESSMENT & PLAN NOTE
Presents with 3-4 weeks of cough and SOB    CXR 4/29/22:  Shows pulmonary congestion without overt edema  Superimposed patchy bibasilar airspace opacities concerning for pneumonia  CT chest 04/29/2022:  Scattered ground-glass and nodular infiltrates especially in the right lung concerning for multifocal pneumonia  Correlation for COVID-19 pneumonia is recommended      Treatment and management per primary medical team

## 2022-04-30 NOTE — ASSESSMENT & PLAN NOTE
· Patient with TORRE x 3-4 weeks  Improved with PO lasix started by PCP after CXR  Patient with SOB last night  · CT Chest:  1  Scattered groundglass and nodular infiltrates especially in the right lungconcerning for multifocal pneumonia  Correlation for Covid 19 pneumonia is recommended    Short interval follow-up with low-dose CT thorax is recommended in 3 months interval   2  Cardiomegaly with bilateral pleural effusions and pericardial calcifications  · Patient received IV Rocephin and po azithromycin in the ED, this was continued on admission  · The patient was discharged on cefpodoxime to complete a 5 day course  · Outpatient follow-up with PCP in 4-6 weeks for repeat CXR

## 2022-04-30 NOTE — CONSULTS
[facility] 1950, 71 y o  male MRN: [id number]  Unit/Bed#: ED 26 Encounter: [id number]  Primary Care Provider: No primary care provider on file  Date and time admitted to hospital: 4/29/2022  9:26 AM    Inpatient consult to Cardiology  Consult performed by: Yuri Payan PA-C  Consult ordered by: Nayeli Katz PA-C          Hypokalemia  Assessment & Plan  Ideally would like to maintain potassium at 4 0    Diarrhea  Assessment & Plan  Supportive measures   Treatment per primary medical team     A-fib [facility])  Assessment & Plan  Patient presents to  and was sent to ED  with Afib with RVR in the setting of Pneumonia    Likely exacerbated by underlying infectious process   Rate is now moderately controlled    He is on metoprolol for rate control in the outpatient setting  Patient is on Norvasc for BP control and BPs are soft    Will discontinue Norvasc and add diltiazem for better rate control   Continue Coumadin for stroke risk prevention    Target INR is 2 0-3 0    Pulmonary edema  Assessment & Plan  Presents with 3-4 weeks of cough and shortness of breath   CXR 04/29/2022:      1  Pulmonary vascular congestion without overt edema  Superimposed patchy bibasilar airspace opacities concerning for pneumonia  2   Cardiomegaly with exaggeration of the upper mid EF sternum likely a result of obliquity  Likely left atrial enlargement  Recommend repeat PA and lateral views  CT chest 04/29/2022:  Cardiomegaly with bilateral pleural effusions and pericardial calcifications  Echocardiogram 04/30/2022:  LVEF low normal 50% no WMA, RV mildly dilated, mild LAD, moderate RAD, mild AI with aortic sclerosis normal bio mitral valve mean gradient 4 3 mm Hg mild TR mild aortic root dilation      Agree with gentle diuretics   Lasix 40 mg daily   Patient to follow with primary cardiologist:  Dr Maritza Mancilla in [address] [place] [address] with 3-4 weeks of cough and SOB    CXR 4/29/22:  Shows pulmonary congestion without overt edema  Superimposed patchy bibasilar airspace opacities concerning for pneumonia  CT chest 04/29/2022:  Scattered ground-glass and nodular infiltrates especially in the right lung concerning for multifocal pneumonia  Correlation for COVID-19 pneumonia is recommended  Treatment and management per primary medical team        Thank you for allowing us to see this pleasant patient in consult  We will follow course along with you  Patient will follow with primary cardiologist, Dr Marcell Duarte in [place]  Chief Complaint:  Chief Complaint   Patient presents with    Dizziness     history of asthma        History of Present Illness: The patient is a 43-year-old gentleman with a known history of AFib HTN and asthma  He presented to the urgent care center with complaints of shortness of breath and diarrhea  He was found to be in atrial fibrillation with rapid ventricular response and was sent to the emergency room  He was complaining of 3 to 4 weeks of dyspnea on exertion and a cough  He had been seen earlier by his PCP in [place] and was started on a Lasix regimen of 20 mg daily  The night before last he thought he had eaten something that did not agree with him and he developed severe diarrhea shortness of breath sweats chills and upset stomach  He came to the urgent care center for treatment  After he was sent to the ER he was given IV diuretics  He feels much improved today and is ready to go home  Heart rate remains somewhat uncontrolled  He has mild edema  His breathing is much improved  Chest x-ray shows pulmonary vascular congestion with superimposed patchy bibasilar airspace opacities concerning for pneumonia  CT scan showed similar thought findings with bilateral pleural effusions and pericardial calcifications     Echocardiogram showed low normal EF biatrial dilation well-seated mitral valve and no wall motion abnormalities  Review of Systems: a 10 point review of systems was conducted and is negative except for as mentioned in the HPI or as below  Review of Systems   Constitutional: Positive for chills  HENT: Negative  Eyes: Negative  Cardiovascular: Positive for dyspnea on exertion and leg swelling  Negative for chest pain, claudication, cyanosis, irregular heartbeat, near-syncope, orthopnea, palpitations, paroxysmal nocturnal dyspnea and syncope  Respiratory: Negative  Negative for cough, hemoptysis, shortness of breath, sleep disturbances due to breathing, snoring, sputum production and wheezing  Endocrine: Negative  Hematologic/Lymphatic: Negative  Skin: Negative  Musculoskeletal: Negative  Gastrointestinal: Positive for abdominal pain and diarrhea  Genitourinary: Negative  Neurological: Negative  Psychiatric/Behavioral: Negative  Allergic/Immunologic: Negative  Past Medical and Surgical History:  Past Medical History:   Diagnosis Date    A-fib [facility])     Mitral valve problem      History reviewed  No pertinent surgical history  Social History     Substance and Sexual Activity   Alcohol Use Yes    Alcohol/week: 4 0 standard drinks    Types: 4 Cans of beer per week    Comment: daily     Social History     Substance and Sexual Activity   Drug Use Never     Social History     Tobacco Use   Smoking Status Never Smoker   Smokeless Tobacco Never Used       Family History:  History reviewed  No pertinent family history  Medication:  (Not in a hospital admission)       Allergies:  No Known Allergies    Physical Exam:  Vitals: Blood pressure 148/72, pulse 100, temperature 98 2 °F (36 8 °C), temperature source Temporal, resp  rate 18, height 6' (1 829 m), weight 94 3 kg (208 lb), SpO2 97 %  , Body mass index is 28 21 kg/m² ,   Orthostatic Blood Pressures      Most Recent Value   Blood Pressure 148/72 filed at 04/30/2022 0806   Patient Position - Orthostatic VS Sitting filed at 04/29/2022 8937      , Systolic (53CZJ), CDE:588 , Min:124 , XXL:044   , Diastolic (98DKG), QCM:35, Min:72, Max:82    Physical Exam  Vitals and nursing note reviewed  Constitutional:       Appearance: He is well-developed  HENT:      Head: Normocephalic and atraumatic  Mouth/Throat:      Mouth: Mucous membranes are moist    Eyes:      General: No scleral icterus  Conjunctiva/sclera: Conjunctivae normal    Neck:      Thyroid: No thyromegaly  Vascular: No JVD  Cardiovascular:      Rate and Rhythm: Tachycardia present  Rhythm irregularly irregular  Heart sounds: Normal heart sounds, S1 normal and S2 normal  No murmur heard  No friction rub  No gallop  Pulmonary:      Effort: No respiratory distress  Breath sounds: No wheezing or rales  Abdominal:      General: Bowel sounds are normal  There is no distension  Palpations: Abdomen is soft  Tenderness: There is no abdominal tenderness  Comments: Aorta not palpable   Musculoskeletal:         General: No tenderness or deformity  Normal range of motion  Cervical back: Normal range of motion and neck supple  Right lower leg: Edema (Mild) present  Left lower leg: Edema (Trace) present  Skin:     General: Skin is warm and dry  Neurological:      General: No focal deficit present  Mental Status: He is alert and oriented to person, place, and time  Psychiatric:         Mood and Affect: Mood normal          Behavior: Behavior normal          Judgment: Judgment normal            Most Recent Cardiac Imaging:   Echo 04/30/2022:  LVEF low normal 50%, no WMA, RV mildly dilated, mild to moderate LAD, mild to moderate RAD mild AI, with aortic sclerosis, normal bio mitral valve with mean gradient 4 3 mm Hg, mild TR, mild aortic root dilation      EKG: Atrial fibrillation Controlled ventricular response  Right bundle branch block  Abnormal ECG  When compared with ECG of 29-APR-2022 07:49, (unconfirmed)  No significant change was found    Lab Results:   Troponins:   Results from last 7 days   Lab Units 04/29/22  1443 04/29/22  1300 04/29/22  1020   HS TNI 0HR ng/L  --   --  6   HS TNI 2HR ng/L  --  6  --    HSTNI D2 ng/L  --  0  --    HS TNI 4HR ng/L 5  --   --    HSTNI D4 ng/L -1  --   --       BNP:  Results from last 6 Months   Lab Units 04/29/22  1020   BNP pg/mL 163*     CBC with diff:   Results from last 7 days   Lab Units 04/30/22  0530 04/29/22  1020   WBC Thousand/uL 8 66 14 16*   HEMOGLOBIN g/dL 13 4 14 7   HEMATOCRIT % 40 9 43 7   PLATELETS Thousands/uL 149 154   RBC Million/uL 4 30 4 61     CMP:  Results from last 7 days   Lab Units 04/30/22  0530 04/29/22  1020   POTASSIUM mmol/L 3 3* 4 5   CHLORIDE mmol/L 99 99   BUN mg/dL 18 15   CREATININE mg/dL 0 95 1 10   CALCIUM mg/dL 9 1 9 4   AST U/L  --  15   ALT U/L  --  17   ALK PHOS U/L  --  91   EGFR ml/min/1 73sq m 80 67     Lipid Profile:

## 2022-04-30 NOTE — ASSESSMENT & PLAN NOTE
· Mild at 3 3  · Patient received PO potassium 40 mEq on day of discharge  · Outpatient follow up with PCP

## 2022-04-30 NOTE — ASSESSMENT & PLAN NOTE
· Patient reports diarrhea the night prior to admission   · Cdiff, stool culture ordered however diarrhea resolved and no specimen collected

## 2022-04-30 NOTE — ASSESSMENT & PLAN NOTE
· Patient currently rate controlled  · Continue home regimen of Lopressor   · Cardiology added Cardizem- this was continued on discharge  · Anticoagulated on Coumadin  · INR 2 52,

## 2022-04-30 NOTE — ASSESSMENT & PLAN NOTE
Presents with 3-4 weeks of cough and shortness of breath   CXR 04/29/2022:      1  Pulmonary vascular congestion without overt edema  Superimposed patchy bibasilar airspace opacities concerning for pneumonia  2   Cardiomegaly with exaggeration of the upper mid EF sternum likely a result of obliquity  Likely left atrial enlargement  Recommend repeat PA and lateral views  CT chest 04/29/2022:  Cardiomegaly with bilateral pleural effusions and pericardial calcifications  Echocardiogram 04/30/2022:  LVEF low normal 50% no WMA, RV mildly dilated, mild LAD, moderate RAD, mild AI with aortic sclerosis normal bio mitral valve mean gradient 4 3 mm Hg mild TR mild aortic root dilation      Agree with gentle diuretics   Lasix 40 mg daily   Patient to follow with primary cardiologist:  Dr Jaleel Chandler in [place]

## 2022-05-01 NOTE — CASE MANAGEMENT
Case Management Assessment & Discharge Planning Note    Patient name Ad Daniel  Location ED 26/ED 26 MRN [id number]  : 1950 Date 2022       Current Admission Date: 2022  Current Admission Diagnosis:Pneumonia   Patient Active Problem List    Diagnosis Date Noted    Hypokalemia 2022    Pneumonia 2022    Pulmonary edema 2022    A-fib ([address] ) 2022    GERD (gastroesophageal reflux disease) 2022    Diarrhea 2022      LOS (days): 1  Geometric Mean LOS (GMLOS) (days): 3 10  Days to GMLOS:2 1     OBJECTIVE:    Risk of Unplanned Readmission Score: 13         Current admission status: Inpatient  Referral Reason: Other (d/c planning)    Preferred Pharmacy:    [facility], [address] [facility]  [place] [address]  Phone: [phone] Fax: [phone] [address] [facility], [address] [address]  Phone: [phone] Fax: [phone]    Primary Care Provider: No primary care provider on file  Primary Insurance: MEDICARE  Secondary Insurance: AETNA    ASSESSMENT:  Active Health Care Proxies    There are no active Health Care Proxies on file  Readmission Root Cause  30 Day Readmission: No    Patient Information  Admitted from[de-identified] Home (pt is in [place])  Mental Status: Alert  During Assessment patient was accompanied by: Spouse  Assessment information provided by[de-identified] Patient,Spouse  Primary Caregiver: Self  Support Systems: Spouse/significant other  [place] of Residence: Other (specify in comment box) ([place])  What city do you live in?: [facility] entry access options   Select all that apply : No steps to enter home  Type of Current Residence: 3 story home  Upon entering residence, is there a bedroom on the main floor (no further steps)?: No  A bedroom is located on the following floor levels of residence (select all that apply):: 2nd Floor  Upon entering residence, is there a bathroom on the main floor (no further steps)?: Yes (pt has 2 steps to the bathroom on the 1st floor)  Indicate which floors of current residence have a bathroom (select all the apply):: 2nd Floor  Number of steps to 2nd floor from main floor: One Flight  In the last 12 months, was there a time when you were not able to pay the mortgage or rent on time?: No  In the last 12 months, how many places have you lived?: 1  In the last 12 months, was there a time when you did not have a steady place to sleep or slept in a shelter (including now)?: No  Homeless/housing insecurity resource given?: N/A  Living Arrangements: Lives w/ Spouse/significant other  Is patient a ?: No    Activities of Daily Living Prior to Admission  Functional Status: Independent  Completes ADLs independently?: Yes  Ambulates independently?: Yes  Does patient use assisted devices?: No  Does patient currently own DME?: No  Does patient have a history of Outpatient Therapy (PT/OT)?: No  Does the patient have a history of Short-Term Rehab?: No  Does patient have a history of HHC?: No  Does patient currently have [facility]?: No         Patient Information Continued  Income Source: Pension/shelter  Does patient have prescription coverage?: Yes ([place] in [place])  Within the past 12 months, you worried that your food would run out before you got the money to buy more : Never true  Within the past 12 months, the food you bought just didnt last and you didnt have money to get more : Never true  Food insecurity resource given?: N/A  Does patient receive dialysis treatments?: No  Does patient have a history of substance abuse?: No  Does patient have a history of Mental Health Diagnosis?: No         Means of Transportation  Means of Transport to Appts[de-identified] Drives Self  In the past 12 months, has lack of transportation kept you from medical appointments or from getting medications?: No  In the past 12 months, has lack of transportation kept you from meetings, work, or from getting things needed for daily living?: No  Was application for public transport provided?: N/A        DISCHARGE DETAILS:    Discharge planning discussed with[de-identified] patient & wife  Freedom of Choice: Yes  Comments - Freedom of Choice: pt denies any d/c needs  [facility] [place] [facility]  CM contacted family/caregiver?: Yes  Were Treatment Team discharge recommendations reviewed with patient/caregiver?: Yes  Did patient/caregiver verbalize understanding of patient care needs?: Yes  Were patient/caregiver advised of the risks associated with not following Treatment Team discharge recommendations?: Yes    Contacts  Patient Contacts: Juanita Laboy  Relationship to Patient[de-identified] Family (wife)  Contact Method:  In Person  Reason/Outcome: Discharge [address]         Is the patient interested in [place] [address] at discharge?: No    DME Referral Provided  Referral made for DME?: No    Other Referral/Resources/Interventions Provided:  Referral Comments: pt denies any needs, pt will stay with his daughter in [place] for a fews days and then return home    Would you like to participate in our [facility] service program?  : No - Declined    Treatment Team Recommendation: Home (home with wife and outpt follow up when he returns home to [place]-  wife will transport)  Discharge Destination Plan[de-identified] Home (home with wife and oupt follow up in [place])  Transport at Discharge : Automobile,Family            pt and family are in agreement with the d/c plan           Accompanied by: Family member           Family notified[de-identified] wife was in the room

## 2022-05-02 NOTE — ED PROVIDER NOTES
History  Chief Complaint   Patient presents with    Dizziness     history of asthma     Patient is a 71-year-old male with history of asthma, AFib on warfarin that presents for evaluation dyspnea  Patient says over the 2 days he has had worsening cough that is productive, rhinorrhea, congestion  Patient says that he has exertional dyspnea after taking several steps which is atypical for him over the past week  He takes albuterol puff inhaler with some improvement symptoms  Patient denies chest pain/with the symptoms  Denies nausea or vomiting has been able to handle p o  She denies associated fevers  Symptoms are moderate, intermittent without alleviating factors  Patient denies abdominal pain, urinary or bowel symptoms  Patient noted to have some pulmonary edema several weeks ago apparently was started on 20 Lasix daily which she has been taking  Prior to Admission Medications   Prescriptions Last Dose Informant Patient Reported? Taking? amLODIPine (NORVASC) 5 mg tablet 4/28/2022 at Unknown time  Yes Yes   Sig: Take 5 mg by mouth daily   amoxicillin (AMOXIL) 500 mg capsule   Yes No   Sig: Take 2,000 mg by mouth 60 minutes pre-procedure   atorvastatin (LIPITOR) 20 mg tablet 4/28/2022 at Unknown time  Yes Yes   Sig: Take 20 mg by mouth daily   budesonide-formoterol (SYMBICORT) 160-4 5 mcg/act inhaler 4/28/2022 at Unknown time  Yes Yes   Sig: Inhale 2 puffs 2 (two) times a day Rinse mouth after use     furosemide (LASIX) 20 mg tablet 4/28/2022 at Unknown time  Yes Yes   Sig: Take 20 mg by mouth daily   metoprolol tartrate (LOPRESSOR) 100 mg tablet 4/28/2022 at Unknown time  Yes Yes   Sig: Take 100 mg by mouth 2 (two) times a day   montelukast (SINGULAIR) 10 mg tablet 4/28/2022 at Unknown time  Yes Yes   Sig: Take 10 mg by mouth daily As directed   omeprazole (PriLOSEC) 20 mg delayed release capsule 4/28/2022 at Unknown time  Yes Yes   Sig: Take 20 mg by mouth daily   warfarin (COUMADIN) 5 mg tablet 2022 at Unknown time  Yes Yes   Si mg        Facility-Administered Medications: None       Past Medical History:   Diagnosis Date    A-fib [facility])     Mitral valve problem        History reviewed  No pertinent surgical history  History reviewed  No pertinent family history  I have reviewed and agree with the history as documented  E-Cigarette/Vaping    E-Cigarette Use Never User      E-Cigarette/Vaping Substances     Social History     Tobacco Use    Smoking status: Never Smoker    Smokeless tobacco: Never Used   Vaping Use    Vaping Use: Never used   Substance Use Topics    Alcohol use: Yes     Alcohol/week: 4 0 standard drinks     Types: 4 Cans of beer per week     Comment: daily    Drug use: Never       Review of Systems   Constitutional: Negative for fever  HENT: Positive for congestion and rhinorrhea  Negative for sore throat  Eyes: Negative for photophobia  Respiratory: Positive for cough and shortness of breath  Cardiovascular: Negative for chest pain  Gastrointestinal: Negative for abdominal pain  Genitourinary: Negative for dysuria  Musculoskeletal: Negative for back pain  Skin: Negative for rash  Neurological: Negative for light-headedness  Hematological: Negative for adenopathy  Psychiatric/Behavioral: Negative for agitation  All other systems reviewed and are negative  Physical Exam  Physical Exam  Vitals reviewed  Constitutional:       General: He is not in acute distress  Appearance: He is well-developed  HENT:      Head: Normocephalic  Eyes:      Pupils: Pupils are equal, round, and reactive to light  Cardiovascular:      Rate and Rhythm: Normal rate and regular rhythm  Heart sounds: Normal heart sounds  No murmur heard  No friction rub  No gallop  Pulmonary:      Comments: Scattered wheezing throughout, no increased work of breathing  Abdominal:      General: Bowel sounds are normal  There is no distension  Palpations: Abdomen is soft  Tenderness: There is no abdominal tenderness  There is no guarding  Musculoskeletal:         General: Normal range of motion  Cervical back: Normal range of motion and neck supple  Skin:     Capillary Refill: Capillary refill takes less than 2 seconds  Neurological:      Mental Status: He is alert and oriented to person, place, and time  Cranial Nerves: No cranial nerve deficit  Sensory: No sensory deficit  Motor: No abnormal muscle tone  Psychiatric:         Behavior: Behavior normal          Thought Content:  Thought content normal          Judgment: Judgment normal          Vital Signs  ED Triage Vitals   Temperature Pulse Respirations Blood Pressure SpO2   04/29/22 0923 04/29/22 0923 04/29/22 0923 04/29/22 0925 04/29/22 0923   98 9 °F (37 2 °C) 88 18 135/82 94 %      Temp Source Heart Rate Source Patient Position - Orthostatic VS BP Location FiO2 (%)   04/29/22 0923 04/29/22 0923 04/29/22 0925 04/29/22 0925 --   Tympanic Monitor Sitting Right arm       Pain Score       04/29/22 0923       No Pain           Vitals:    04/29/22 1754 04/30/22 0806 04/30/22 1045 04/30/22 1437   BP: 124/82 148/72 126/56 126/74   Pulse: 104 100 78 76   Patient Position - Orthostatic VS: Sitting  Lying Sitting         Visual Acuity      ED Medications  Medications   ipratropium-albuterol (DUO-NEB) 0 5-2 5 mg/3 mL inhalation solution 3 mL (3 mL Nebulization Given 4/29/22 1036)   furosemide (LASIX) injection 40 mg (40 mg Intravenous Given 4/29/22 1036)   iohexol (OMNIPAQUE) 350 MG/ML injection (SINGLE-DOSE) 85 mL (85 mL Intravenous Given 4/29/22 1129)   albuterol (PROVENTIL HFA,VENTOLIN HFA) inhaler 2 puff (2 puffs Inhalation Given 4/29/22 1432)   cefTRIAXone (ROCEPHIN) IVPB (premix in dextrose) 1,000 mg 50 mL (0 mg Intravenous Stopped 4/29/22 1503)   azithromycin (ZITHROMAX) 500 mg in sodium chloride 0 9 % 250 mL IVPB (0 mg Intravenous Stopped 4/29/22 1646)   potassium chloride (K-DUR,KLOR-CON) CR tablet 40 mEq (40 mEq Oral Given 4/30/22 1002)       Diagnostic Studies  Results Reviewed     Procedure Component Value Units Date/Time    Procalcitonin, Next Day AM Collection [[id number]]  (Abnormal) Collected: 04/30/22 0530    Lab Status: Final result Specimen: Blood from Arm, Right Updated: 04/30/22 0607     Procalcitonin 0 33 ng/ml     Basic metabolic panel, AM Draw, Tomorrow [[id number]]  (Abnormal) Collected: 04/30/22 0530    Lab Status: Final result Specimen: Blood from Arm, Right Updated: 04/30/22 1038     Sodium 136 mmol/L      Potassium 3 3 mmol/L      Chloride 99 mmol/L      CO2 31 mmol/L      ANION GAP 6 mmol/L      BUN 18 mg/dL      Creatinine 0 95 mg/dL      Glucose 104 mg/dL      Calcium 9 1 mg/dL      eGFR 80 ml/min/1 73sq m     Narrative:      [place] guidelines for Chronic Kidney Disease (CKD):     Stage 1 with normal or high GFR (GFR > 90 mL/min/1 73 square meters)    Stage 2 Mild CKD (GFR = 60-89 mL/min/1 73 square meters)    Stage 3A Moderate CKD (GFR = 45-59 mL/min/1 73 square meters)    Stage 3B Moderate CKD (GFR = 30-44 mL/min/1 73 square meters)    Stage 4 Severe CKD (GFR = 15-29 mL/min/1 73 square meters)    Stage 5 End Stage CKD (GFR <15 mL/min/1 73 square meters)  Note: GFR calculation is accurate only with a steady state creatinine    Magnesium, AM Draw, Tomorrow [[id number]]  (Normal) Collected: 04/30/22 0530    Lab Status: Final result Specimen: Blood from Arm, Right Updated: 04/30/22 0605     Magnesium 2 2 mg/dL     Phosphorus, AM Draw, Tomorrow [[id number]]  (Normal) Collected: 04/30/22 0530    Lab Status: Final result Specimen: Blood from Arm, Right Updated: 04/30/22 0605     Phosphorus 2 5 mg/dL     CBC and Platelet, AM Draw, Tomorrow [[id number]]  (Normal) Collected: 04/30/22 0530    Lab Status: Final result Specimen: Blood from Arm, Right Updated: 04/30/22 0545     WBC 8 66 Thousand/uL      RBC 4 30 Million/uL Hemoglobin 13 4 g/dL      Hematocrit 40 9 %      MCV 95 fL      MCH 31 2 pg      MCHC 32 8 g/dL      RDW 12 4 %      Platelets 455 Thousands/uL      MPV 9 7 fL     Procalcitonin [[id number]]  (Abnormal) Collected: 04/29/22 1020    Lab Status: Final result Specimen: Blood from Arm, Right Updated: 04/29/22 1602     Procalcitonin 0 27 ng/ml     Clostridium difficile toxin by PCR with EIA [[id number]]     Lab Status: No result Specimen: Stool from Per Rectum     HS Troponin I 4hr [[id number]]  (Normal) Collected: 04/29/22 1443    Lab Status: Final result Specimen: Blood Updated: 04/29/22 1509     hs TnI 4hr 5 ng/L      Delta 4hr hsTnI -1 ng/L     Sputum culture and Gram stain [[id number]]     Lab Status: No result Specimen: Expectorated Sputum     HS Troponin I 2hr [[id number]]  (Normal) Collected: 04/29/22 1300    Lab Status: Final result Specimen: Blood from Arm, Right Updated: 04/29/22 1353     hs TnI 2hr 6 ng/L      Delta 2hr hsTnI 0 ng/L     COVID/FLU/RSV [[id number]]  (Normal) Collected: 04/29/22 1203    Lab Status: Final result Specimen: Nares from Nose Updated: 04/29/22 1250     SARS-CoV-2 Negative     INFLUENZA A PCR Negative     INFLUENZA B PCR Negative     RSV PCR Negative    Narrative:      FOR PEDIATRIC PATIENTS - copy/paste COVID Guidelines URL to browser: [email or web address]  [email or web address]    SARS-CoV-2 assay is a Nucleic Acid Amplification assay intended for the  qualitative detection of nucleic acid from SARS-CoV-2 in nasopharyngeal  swabs  Results are for the presumptive identification of SARS-CoV-2 RNA  Positive results are indicative of infection with SARS-CoV-2, the virus  causing COVID-19, but do not rule out bacterial infection or co-infection  with other viruses  Laboratories within the [place] and its  territories are required to report all positive results to the appropriate  public health authorities   Negative results do not preclude SARS-CoV-2  infection and should not be used as the sole basis for treatment or other  patient management decisions  Negative results must be combined with  clinical observations, patient history, and epidemiological information  This test has not been FDA cleared or approved  This test has been authorized by FDA under an Emergency Use Authorization  (EUA)  This test is only authorized for the duration of time the  declaration that circumstances exist justifying the authorization of the  emergency use of an in vitro diagnostic tests for detection of SARS-CoV-2  virus and/or diagnosis of COVID-19 infection under section 564(b)(1) of  the Act, 21 U  S C  449GTW-3(V)(9), unless the authorization is terminated  or revoked sooner  The test has been validated but independent review by FDA  and CLIA is pending  Test performed using [employer] GeneXpert: This RT-PCR assay targets N2,  a region unique to SARS-CoV-2  A conserved region in the E-gene was chosen  for pan-Sarbecovirus detection which includes SARS-CoV-2      HS Troponin 0hr (reflex protocol) [[id number]]  (Normal) Collected: 04/29/22 1020    Lab Status: Final result Specimen: Blood from Arm, Right Updated: 04/29/22 1202     hs TnI 0hr 6 ng/L     Protime-INR [[id number]]  (Abnormal) Collected: 04/29/22 1020    Lab Status: Final result Specimen: Blood from Arm, Right Updated: 04/29/22 1100     Protime 26 5 seconds      INR 2 52    B-Type Natriuretic Peptide(BNP) [place], ,  Campuses Only [[id number]]  (Abnormal) Collected: 04/29/22 1020    Lab Status: Final result Specimen: Blood from Arm, Right Updated: 04/29/22 1053      pg/mL     Comprehensive metabolic panel [[id number]]  (Abnormal) Collected: 04/29/22 1020    Lab Status: Final result Specimen: Blood from Arm, Right Updated: 04/29/22 1048     Sodium 136 mmol/L      Potassium 4 5 mmol/L      Chloride 99 mmol/L      CO2 30 mmol/L      ANION GAP 7 mmol/L      BUN 15 mg/dL      Creatinine 1 10 mg/dL Glucose 117 mg/dL      Calcium 9 4 mg/dL      AST 15 U/L      ALT 17 U/L      Alkaline Phosphatase 91 U/L      Total Protein 7 2 g/dL      Albumin 4 2 g/dL      Total Bilirubin 2 18 mg/dL      eGFR 67 ml/min/1 73sq m     Narrative:      [place] guidelines for Chronic Kidney Disease (CKD):     Stage 1 with normal or high GFR (GFR > 90 mL/min/1 73 square meters)    Stage 2 Mild CKD (GFR = 60-89 mL/min/1 73 square meters)    Stage 3A Moderate CKD (GFR = 45-59 mL/min/1 73 square meters)    Stage 3B Moderate CKD (GFR = 30-44 mL/min/1 73 square meters)    Stage 4 Severe CKD (GFR = 15-29 mL/min/1 73 square meters)    Stage 5 End Stage CKD (GFR <15 mL/min/1 73 square meters)  Note: GFR calculation is accurate only with a steady state creatinine    CBC and differential [[id number]]  (Abnormal) Collected: 04/29/22 1020    Lab Status: Final result Specimen: Blood from Arm, Right Updated: 04/29/22 1029     WBC 14 16 Thousand/uL      RBC 4 61 Million/uL      Hemoglobin 14 7 g/dL      Hematocrit 43 7 %      MCV 95 fL      MCH 31 9 pg      MCHC 33 6 g/dL      RDW 12 4 %      MPV 10 1 fL      Platelets 167 Thousands/uL      nRBC 0 /100 WBCs      Neutrophils Relative 86 %      Immat GRANS % 0 %      Lymphocytes Relative 5 %      Monocytes Relative 9 %      Eosinophils Relative 0 %      Basophils Relative 0 %      Neutrophils Absolute 12 11 Thousands/µL      Immature Grans Absolute 0 05 Thousand/uL      Lymphocytes Absolute 0 66 Thousands/µL      Monocytes Absolute 1 31 Thousand/µL      Eosinophils Absolute 0 01 Thousand/µL      Basophils Absolute 0 02 Thousands/µL                  CT chest with contrast   Final Result by Barbara Tadeo MD (04/29 0406)   1  Scattered groundglass and nodular infiltrates especially in the right lungconcerning for multifocal pneumonia  Correlation for Covid 19 pneumonia is recommended     Short interval follow-up with low-dose CT thorax is recommended in 3 months interval    2  Cardiomegaly with bilateral pleural effusions and pericardial calcifications  The study was marked in [facility] for immediate notification of acute findings and need for short interval follow-up  Workstation performed: [id number]         X-ray chest 1 view portable   ED Interpretation by Jenna Giordano MD (04/29 1008)   ED wet read:  Pulmonary vascular congestion noted      Final Result by Xochitl Lawrence MD (04/29 1057)      1  Pulmonary vascular congestion without overt edema  Superimposed patchy bibasilar airspace opacities concerning for pneumonia  2   Cardiomegaly with exaggeration of the upper mediastinum likely a result of obliquity  Likely left atrial enlargement  Recommend repeat PA and lateral radiographs if possible  The study was marked in EPIC for significant notification  Workstation performed: [id number]                    Procedures  Procedures         ED Course                               SBIRT 20yo+      Most Recent Value   SBIRT (24 yo +)    In order to provide better care to our patients, we are screening all of our patients for alcohol and drug use  Would it be okay to ask you these screening questions? No Filed at: 04/29/2022 1107                    MDM  Number of Diagnoses or Management Options  Hypoxia  Pneumonia  Diagnosis management comments: Patient is a 71-year-old male presents for evaluation of cough and exertional dyspnea found to have likely multifocal pneumonia  Patient was going to be discharged but on ambulation, desaturated to 85%  Patient will require admission for further workup and management        Disposition  Final diagnoses:   Pneumonia   Hypoxia     Time reflects when diagnosis was documented in both MDM as applicable and the Disposition within this note     Time User Action Codes Description Comment    4/29/2022  1:58 PM He Butler Add [J18 9] Pneumonia     4/29/2022  1:58 PM Ksenia Mattson Add [R09 02] Hypoxia     4/29/2022  3:50 PM Maral Charleshold Add [J81 0] Acute pulmonary edema ([address] )     4/30/2022  2:05 PM Radhajosehari Rajeev [place] [I48 91] Atrial fibrillation, unspecified type [facility])       ED Disposition     ED Disposition Condition Date/Time Comment    Discharge  Sat Apr 30, 2022  2:54 PM Case was discussed with MISTY and the patient's admission status was agreed to be Admission Status: observation status to the service of Dr Emily Cornejo           Follow-up Information    None         Discharge Medication List as of 4/30/2022  2:39 PM      START taking these medications    Details   cefpodoxime (VANTIN) 200 mg tablet Take 1 tablet (200 mg total) by mouth 2 (two) times a day for 4 days, Starting Sat 4/30/2022, Until Wed 5/4/2022, Normal      diltiazem (CARDIZEM CD) 180 mg 24 hr capsule Take 1 capsule (180 mg total) by mouth daily, Starting Sun 5/1/2022, Normal         CONTINUE these medications which have CHANGED    Details   furosemide (LASIX) 40 mg tablet Take 1 tablet (40 mg total) by mouth daily, Starting Sat 4/30/2022, Until Mon 5/30/2022, Normal         CONTINUE these medications which have NOT CHANGED    Details   atorvastatin (LIPITOR) 20 mg tablet Take 20 mg by mouth daily, Starting Fri 2/11/2022, Historical Med      budesonide-formoterol (SYMBICORT) 160-4 5 mcg/act inhaler Inhale 2 puffs 2 (two) times a day Rinse mouth after use , Historical Med      metoprolol tartrate (LOPRESSOR) 100 mg tablet Take 100 mg by mouth 2 (two) times a day, Starting Fri 2/4/2022, Historical Med      montelukast (SINGULAIR) 10 mg tablet Take 10 mg by mouth daily As directed, Starting Sat 3/5/2022, Historical Med      omeprazole (PriLOSEC) 20 mg delayed release capsule Take 20 mg by mouth daily, Starting Wed 3/9/2022, Historical Med      warfarin (COUMADIN) 5 mg tablet 5 mg  , Historical Med      amoxicillin (AMOXIL) 500 mg capsule Take 2,000 mg by mouth 60 minutes pre-procedure, Starting Fri 2/4/2022, Historical Med         STOP taking these medications       amLODIPine (NORVASC) 5 mg tablet Comments:   Reason for Stopping:               Outpatient Discharge Orders   Activity as tolerated     Call provider for:  persistent dizziness or light-headedness     Call provider for:  extreme fatigue     Call provider for:       PDMP Review     None          ED Provider  Electronically Signed by           Olmna Almonte MD  05/01/22 Manny Portillo MD  05/01/22 [id number]

## 2022-05-16 NOTE — PHYSICIAN ADVISOR
Current patient class: Inpatient  The patient is currently on Hospital Day: 2 at [address] [place] [address]      The patient was admitted to the hospital at  2:13 PM on 4/29/22 for the following diagnosis:  Dyspnea on exertion [R06 00]       There was documentation in the medical record of an expected length of stay of at least 2 midnights  The patient was therefore expected to satisfy the 2 midnight benchmark and given the 2 midnight presumption was appropriate for INPATIENT ADMISSION  Given this expectation of a satisfying stay, CMS instructs us that the patient is most often appropriate for inpatient admission under part A provided medical necessity is documented in the chart  After review of the relevant documentation, labs, vital signs and test results, the patient is appropriate for INPATIENT ADMISSION  Admission to the hospital as an inpatient is a complex decision making process which requires the practitioner to consider the patients presenting complaint, history and physical examination and all relevant testing  With this in mind, in this case, the patient was deemed appropriate for INPATIENT ADMISSION  After review of the documentation and testing available at the time of the admission, I concur with this clinical determination of medical necessity  For the reason noted, the patient was discharged before reaching 2 midnights as an inpatient  Rationale is as follows: The patient is a 71-year-old male who presented with shortness of breath and diarrhea  Chest x-ray showed pulmonary vascular congestion with superimposed patchy bibasilar airspace opacities concerning for pneumonia  CT showed similar findings  Procalcitonin was elevated increasing likelihood for bacterial respiratory infection  In the outpatient setting he was evaluated by his PCP and was started on Lasix  The patient was admitted as an inpatient with diagnoses of pneumonia and pulmonary edema    Plan was for intravenous antibiotics, intravenous diuretic, echocardiogram, cardiology consult  He had one reported episode of hypoxia to 85%  Decision was made that the patient could continue antibiotic and diuretic treatment in the outpatient setting  The discharge summary indicates why the patient was discharged earlier than anticipated  Inpatient class was a reasonable initial patient class order given the multiple concerns of diarrhea, pneumonia, vascular congestion/pulmonary edema  He did not have a known history of heart failure but attempts were made to initiate diuretics as an outpatient however his symptoms progressed  The patients vitals on arrival were   ED Triage Vitals   Temperature Pulse Respirations Blood Pressure SpO2   04/29/22 0923 04/29/22 0923 04/29/22 0923 04/29/22 0925 04/29/22 0923   98 9 °F (37 2 °C) 88 18 135/82 94 %      Temp Source Heart Rate Source Patient Position - Orthostatic VS BP Location FiO2 (%)   04/29/22 0923 04/29/22 0923 04/29/22 0925 04/29/22 0925 --   Tympanic Monitor Sitting Right arm       Pain Score       04/29/22 0923       No Pain           Past Medical History:   Diagnosis Date    [facility]-fib [facility])     Mitral valve problem      History reviewed  No pertinent surgical history  Consults have been placed to:   IP CONSULT TO CASE MANAGEMENT  IP CONSULT TO CARDIOLOGY    Vitals:    04/30/22 0806 04/30/22 0815 04/30/22 1045 04/30/22 1437   BP: 148/72  126/56 126/74   BP Location:   Left arm    Pulse: 100  78 76   Resp:   18 17   Temp:  98 2 °F (36 8 °C)     TempSrc:  Temporal     SpO2:   98% 96%   Weight:       Height:           Most recent labs:    No results for input(s): WBC, HGB, HCT, PLT, K, NA, CALCIUM, BUN, CREATININE, LIPASE, AMYLASE, INR, TROPONINI, CKTOTAL, AST, ALT, ALKPHOS, BILITOT in the last 72 hours  Scheduled Meds:  Continuous Infusions:No current facility-administered medications for this encounter  PRN Meds:      Surgical procedures (if appropriate):